# Patient Record
Sex: FEMALE | Race: WHITE | Employment: STUDENT | URBAN - METROPOLITAN AREA
[De-identification: names, ages, dates, MRNs, and addresses within clinical notes are randomized per-mention and may not be internally consistent; named-entity substitution may affect disease eponyms.]

---
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
## 2024-01-02 LAB — RPR, EXTERNAL RESULT: NORMAL

## 2024-01-29 ENCOUNTER — HOSPITAL ENCOUNTER (OUTPATIENT)
Age: 19
Discharge: HOME | End: 2024-01-29
Attending: OBSTETRICS & GYNECOLOGY | Admitting: OBSTETRICS & GYNECOLOGY
Payer: COMMERCIAL

## 2024-01-29 DIAGNOSIS — O23.43 URINARY TRACT INFECTION IN MOTHER DURING THIRD TRIMESTER OF PREGNANCY: Primary | ICD-10-CM

## 2024-01-29 LAB
AMPHETAMINES: NEGATIVE
BACTERIA: NEGATIVE /HPF
BARBITURATE SCREEN URINE: NEGATIVE
BENZODIAZEPINE SCREEN, URINE: NEGATIVE
BILIRUBIN URINE: NEGATIVE MG/DL
BLOOD, URINE: ABNORMAL
CANNABINOID SCREEN URINE: NEGATIVE
CLARITY: ABNORMAL
COCAINE METABOLITE: NEGATIVE
COLOR: YELLOW
FENTANYL URINE: NEGATIVE
GLUCOSE, URINE: NEGATIVE MG/DL
KETONES, URINE: NEGATIVE MG/DL
LEUKOCYTE ESTERASE, URINE: ABNORMAL
MUCUS: ABNORMAL HPF
NITRITE URINE, QUANTITATIVE: NEGATIVE
OPIATES, URINE: NEGATIVE
OXYCODONE: NEGATIVE
PH, URINE: 6 (ref 5–8)
PROTEIN UA: NEGATIVE MG/DL
RBC URINE: 29 /HPF (ref 0–6)
RENAL EPITHELIAL, UA: 1 /HPF
SPECIFIC GRAVITY UA: 1.02 (ref 1–1.03)
SQUAMOUS EPITHELIAL: 16 /HPF
TRICHOMONAS: ABNORMAL /HPF
UROBILINOGEN, URINE: 0.2 MG/DL (ref 0.2–1)
WBC UA: 91 /HPF (ref 0–5)

## 2024-01-29 PROCEDURE — 80307 DRUG TEST PRSMV CHEM ANLYZR: CPT

## 2024-01-29 PROCEDURE — 81001 URINALYSIS AUTO W/SCOPE: CPT

## 2024-01-29 PROCEDURE — 99213 OFFICE O/P EST LOW 20 MIN: CPT

## 2024-01-29 RX ORDER — NITROFURANTOIN 25; 75 MG/1; MG/1
100 CAPSULE ORAL 2 TIMES DAILY
Qty: 14 CAPSULE | Refills: 0 | Status: SHIPPED | OUTPATIENT
Start: 2024-01-29 | End: 2024-02-05

## 2024-01-30 VITALS
BODY MASS INDEX: 32.39 KG/M2 | HEART RATE: 91 BPM | TEMPERATURE: 98.2 F | RESPIRATION RATE: 18 BRPM | SYSTOLIC BLOOD PRESSURE: 133 MMHG | DIASTOLIC BLOOD PRESSURE: 92 MMHG | OXYGEN SATURATION: 100 % | WEIGHT: 176 LBS | HEIGHT: 62 IN

## 2024-01-30 NOTE — FLOWSHEET NOTE
Dr. Sy @ nurses' station.  Reported to doc patient's complaints, GA, assessment findings, and FMS reviewed.  Orders received.

## 2024-01-30 NOTE — FLOWSHEET NOTE
Assessment complete. Abdomen soft and non tender.  Positive fetal movement.  Patient denies any vaginal bleeding or leaking of fluid.  Patient states her worsening cramping pain started approximately 8 am this morning.  Patient reports burning while voiding as well as urgency.  Patient denies ahaving any intercourse in the last 24 hours. Instructed patient on tentative POC.  Patient voiced understanding.

## 2024-01-30 NOTE — DISCHARGE INSTRUCTIONS
LABOR    Call your doctor if you have these signs:     Regular tightening of the uterus coming as often as once every 15 minutes     Menstrual-like cramps, they may be regular, or may be continuous and move to   your back.     A low, dull backache different from what you normally experience. It may come and go.    Vaginal leaking of fluid.     Any bleeding from your vagina.    Pain, burning or bleeding when you urinate.      LABOR    Call your doctor, or come to the hospital, if you have:    Contractions coming about every 3-5 minutes apart, for about one hour. Labor contractions are usually strong enough that you can not walk or talk while having the contractions. ( Contractions can feel like menstrual cramps, tightening in your abdomen, rectal pressure or a backache. This feeling comes and goes.)    Vaginal leaking of fluid.    Heavy, bright red bleeding, like the days of your period. ( A little bloody show or spotting is normal in labor.)    ALWAYS CALL YOUR DOCTOR IF YOU:    Notice decreased movement of your baby, different from what you are used to.    Have heavy, bright red bleeding, like the heavy days of your periods.    Have vaginal leaking of fluid.    Keep your next appointment as scheduled. Get prescription filled and take as ordered.    Activity : Regular    Diet : Regular

## 2024-02-19 LAB — GBS, EXTERNAL RESULT: NEGATIVE

## 2024-02-27 ENCOUNTER — HOSPITAL ENCOUNTER (OUTPATIENT)
Age: 19
Discharge: HOME | End: 2024-02-27
Attending: STUDENT IN AN ORGANIZED HEALTH CARE EDUCATION/TRAINING PROGRAM | Admitting: STUDENT IN AN ORGANIZED HEALTH CARE EDUCATION/TRAINING PROGRAM
Payer: COMMERCIAL

## 2024-02-27 VITALS
DIASTOLIC BLOOD PRESSURE: 64 MMHG | WEIGHT: 181 LBS | HEART RATE: 117 BPM | OXYGEN SATURATION: 98 % | HEIGHT: 62 IN | SYSTOLIC BLOOD PRESSURE: 121 MMHG | BODY MASS INDEX: 33.31 KG/M2 | RESPIRATION RATE: 18 BRPM | TEMPERATURE: 99 F

## 2024-02-27 LAB
BACTERIA: ABNORMAL /HPF
BILIRUBIN URINE: NEGATIVE MG/DL
BLOOD, URINE: ABNORMAL
CLARITY: CLEAR
COLOR: YELLOW
GLUCOSE, URINE: NEGATIVE MG/DL
KETONES, URINE: 15 MG/DL
LEUKOCYTE ESTERASE, URINE: ABNORMAL
NITRITE URINE, QUANTITATIVE: NEGATIVE
PH, URINE: 6.5 (ref 5–8)
PROTEIN UA: NEGATIVE MG/DL
RBC URINE: 0 /HPF (ref 0–6)
SPECIFIC GRAVITY UA: 1.01 (ref 1–1.03)
SQUAMOUS EPITHELIAL: 3 /HPF
TRICHOMONAS: ABNORMAL /HPF
UROBILINOGEN, URINE: 0.2 MG/DL (ref 0.2–1)
WBC CLUMP: ABNORMAL /HPF
WBC UA: 247 /HPF (ref 0–5)
YEAST: ABNORMAL /HPF

## 2024-02-27 PROCEDURE — 96361 HYDRATE IV INFUSION ADD-ON: CPT

## 2024-02-27 PROCEDURE — 2580000003 HC RX 258: Performed by: STUDENT IN AN ORGANIZED HEALTH CARE EDUCATION/TRAINING PROGRAM

## 2024-02-27 PROCEDURE — 81001 URINALYSIS AUTO W/SCOPE: CPT

## 2024-02-27 PROCEDURE — 96360 HYDRATION IV INFUSION INIT: CPT

## 2024-02-27 PROCEDURE — 99214 OFFICE O/P EST MOD 30 MIN: CPT

## 2024-02-27 RX ORDER — SODIUM CHLORIDE, SODIUM LACTATE, POTASSIUM CHLORIDE, AND CALCIUM CHLORIDE .6; .31; .03; .02 G/100ML; G/100ML; G/100ML; G/100ML
2000 INJECTION, SOLUTION INTRAVENOUS ONCE
Status: COMPLETED | OUTPATIENT
Start: 2024-02-27 | End: 2024-02-27

## 2024-02-27 RX ORDER — 0.9 % SODIUM CHLORIDE 0.9 %
1000 INTRAVENOUS SOLUTION INTRAVENOUS ONCE
Status: DISCONTINUED | OUTPATIENT
Start: 2024-02-27 | End: 2024-02-27 | Stop reason: HOSPADM

## 2024-02-27 RX ORDER — CEPHALEXIN 500 MG/1
500 CAPSULE ORAL 4 TIMES DAILY
Qty: 28 CAPSULE | Refills: 0 | Status: SHIPPED | OUTPATIENT
Start: 2024-02-27 | End: 2024-03-05

## 2024-02-27 RX ADMIN — SODIUM CHLORIDE, POTASSIUM CHLORIDE, SODIUM LACTATE AND CALCIUM CHLORIDE 1000 ML: 600; 310; 30; 20 INJECTION, SOLUTION INTRAVENOUS at 01:12

## 2024-02-27 ASSESSMENT — PAIN DESCRIPTION - DESCRIPTORS: DESCRIPTORS: PRESSURE

## 2024-02-27 NOTE — FLOWSHEET NOTE
TR to Dr. Harrell regarding patient refusing IV fluids, refusing antibiotic, and requesting to leave.  Patient tells nurse she is tired and just wants to go home.  Informed doc nurse has educated patient that her lab work shows signs of a UTI that needs treated, and her baby's FHT pattern is tachycardic and may benefit from more fluid.  RN explained to patient and patient's mother the doctor advises patient to stay and be treated and the FMS is concerning.  RN asks doc if she would like to come out to triage room and attempt to discuss the risks of leaving before getting treatment completely or if she would like the patient to sign an AMA paper.  Dr. Harrell instructs nurse to have patient sign the AMA paper, and she will call an antibiotic in to the patient's pharmacy.  RN voiced understanding.

## 2024-02-27 NOTE — FLOWSHEET NOTE
Discharge instructions reviewed with patient.  Patient voiced understanding.  Monitors removed by patient @ [id number].  Instructed to change out of gown.

## 2024-02-27 NOTE — FLOWSHEET NOTE
Patient presents to BC with c/o pressure.  Oriented to LT-04 and call light system.  Instructed to change into gown and in need of CCUA.  Patient voiced understanding.

## 2024-02-27 NOTE — FLOWSHEET NOTE
Assessment complete. Abdomen soft and non tender.  Positive fetal movement.  Patient denies any vaginal bleeding or leaking of fluid.  Patient states her worsening vaginal pressure pain started approximately 1 hour ago.  Patient states she had just finished a prescription for a yeast infection 2 days ago.  Patient states she had seen her regular OB yesterday.   Instructed patient on tentative POC.  Patient voiced understanding.

## 2024-02-27 NOTE — DISCHARGE INSTRUCTIONS
LABOR    Call your doctor if you have these signs:     Regular tightening of the uterus coming as often as once every 15 minutes     Menstrual-like cramps, they may be regular, or may be continuous and move to   your back.     A low, dull backache different from what you normally experience. It may come and go.    Vaginal leaking of fluid.     Any bleeding from your vagina.    Pain, burning or bleeding when you urinate.      LABOR    Call your doctor, or come to the hospital, if you have:    Contractions coming about every 3-5 minutes apart, for about one hour. Labor contractions are usually strong enough that you can not walk or talk while having the contractions. ( Contractions can feel like menstrual cramps, tightening in your abdomen, rectal pressure or a backache. This feeling comes and goes.)    Vaginal leaking of fluid.    Heavy, bright red bleeding, like the days of your period. ( A little bloody show or spotting is normal in labor.)    ALWAYS CALL YOUR DOCTOR IF YOU:    Notice decreased movement of your baby, different from what you are used to.    Have heavy, bright red bleeding, like the heavy days of your periods.    Have vaginal leaking of fluid.    Keep your next appointment     Activity: As Tolerated    Diet: As Tolerated

## 2024-02-27 NOTE — FLOWSHEET NOTE
TR to Dr. Harrell regarding patient's complaints, GA, OB history, patient's VS, FMS, SVE results, and patient telling nurse she has not had much fluids throughout the day.  New orders received.  RN voiced understanding.

## 2024-02-28 ENCOUNTER — HOSPITAL ENCOUNTER (INPATIENT)
Age: 19
LOS: 3 days | Discharge: HOME | End: 2024-03-02
Attending: STUDENT IN AN ORGANIZED HEALTH CARE EDUCATION/TRAINING PROGRAM | Admitting: OBSTETRICS & GYNECOLOGY
Payer: COMMERCIAL

## 2024-02-28 LAB
ABO/RH: NORMAL
AMPHETAMINES: NEGATIVE
ANTIBODY SCREEN: NEGATIVE
BARBITURATE SCREEN URINE: NEGATIVE
BENZODIAZEPINE SCREEN, URINE: NEGATIVE
BILIRUBIN URINE: NEGATIVE MG/DL
BLOOD, URINE: ABNORMAL
CANNABINOID SCREEN URINE: NEGATIVE
CLARITY: ABNORMAL
COCAINE METABOLITE: NEGATIVE
COLOR: YELLOW
FENTANYL URINE: NEGATIVE
GLUCOSE, URINE: NEGATIVE MG/DL
HCT VFR BLD CALC: 33.9 % (ref 37–47)
HEMOGLOBIN: 10.3 GM/DL (ref 12.5–16)
KETONES, URINE: 40 MG/DL
LEUKOCYTE ESTERASE, URINE: ABNORMAL
MCH RBC QN AUTO: 24.8 PG (ref 27–31)
MCHC RBC AUTO-ENTMCNC: 30.4 % (ref 32–36)
MCV RBC AUTO: 81.5 FL (ref 78–100)
NITRITE URINE, QUANTITATIVE: NEGATIVE
OPIATES, URINE: NEGATIVE
OXYCODONE: NEGATIVE
PDW BLD-RTO: 15 % (ref 11.7–14.9)
PH, URINE: 6 (ref 5–8)
PLATELET # BLD: 272 K/CU MM (ref 140–440)
PMV BLD AUTO: 10.3 FL (ref 7.5–11.1)
PROTEIN UA: 30 MG/DL
RBC # BLD: 4.16 M/CU MM (ref 4.2–5.4)
SPECIFIC GRAVITY UA: 1.01 (ref 1–1.03)
UROBILINOGEN, URINE: 0.2 MG/DL (ref 0.2–1)
WBC # BLD: 20.6 K/CU MM (ref 4–10.5)

## 2024-02-28 PROCEDURE — 6370000000 HC RX 637 (ALT 250 FOR IP): Performed by: OBSTETRICS & GYNECOLOGY

## 2024-02-28 PROCEDURE — 87186 SC STD MICRODIL/AGAR DIL: CPT

## 2024-02-28 PROCEDURE — 6360000002 HC RX W HCPCS: Performed by: OBSTETRICS & GYNECOLOGY

## 2024-02-28 PROCEDURE — 1220000000 HC SEMI PRIVATE OB R&B

## 2024-02-28 PROCEDURE — 86901 BLOOD TYPING SEROLOGIC RH(D): CPT

## 2024-02-28 PROCEDURE — 85027 COMPLETE CBC AUTOMATED: CPT

## 2024-02-28 PROCEDURE — 86850 RBC ANTIBODY SCREEN: CPT

## 2024-02-28 PROCEDURE — 80307 DRUG TEST PRSMV CHEM ANLYZR: CPT

## 2024-02-28 PROCEDURE — 81001 URINALYSIS AUTO W/SCOPE: CPT

## 2024-02-28 PROCEDURE — 87086 URINE CULTURE/COLONY COUNT: CPT

## 2024-02-28 PROCEDURE — 2580000003 HC RX 258: Performed by: OBSTETRICS & GYNECOLOGY

## 2024-02-28 PROCEDURE — 87077 CULTURE AEROBIC IDENTIFY: CPT

## 2024-02-28 PROCEDURE — 86780 TREPONEMA PALLIDUM: CPT

## 2024-02-28 PROCEDURE — 86900 BLOOD TYPING SEROLOGIC ABO: CPT

## 2024-02-28 RX ORDER — CARBOPROST TROMETHAMINE 250 UG/ML
250 INJECTION, SOLUTION INTRAMUSCULAR PRN
Status: DISCONTINUED | OUTPATIENT
Start: 2024-02-28 | End: 2024-03-02 | Stop reason: HOSPADM

## 2024-02-28 RX ORDER — ONDANSETRON 2 MG/ML
4 INJECTION INTRAMUSCULAR; INTRAVENOUS EVERY 6 HOURS PRN
Status: DISCONTINUED | OUTPATIENT
Start: 2024-02-28 | End: 2024-02-28 | Stop reason: HOSPADM

## 2024-02-28 RX ORDER — FAMOTIDINE 10 MG/ML
20 INJECTION, SOLUTION INTRAVENOUS 2 TIMES DAILY PRN
Status: DISCONTINUED | OUTPATIENT
Start: 2024-02-28 | End: 2024-02-28 | Stop reason: HOSPADM

## 2024-02-28 RX ORDER — ACETAMINOPHEN 500 MG
1000 TABLET ORAL EVERY 8 HOURS PRN
Status: DISCONTINUED | OUTPATIENT
Start: 2024-02-28 | End: 2024-03-02 | Stop reason: HOSPADM

## 2024-02-28 RX ORDER — SODIUM CHLORIDE 9 MG/ML
25 INJECTION, SOLUTION INTRAVENOUS PRN
Status: DISCONTINUED | OUTPATIENT
Start: 2024-02-28 | End: 2024-03-02 | Stop reason: HOSPADM

## 2024-02-28 RX ORDER — SODIUM CHLORIDE 9 MG/ML
INJECTION, SOLUTION INTRAVENOUS CONTINUOUS
Status: DISCONTINUED | OUTPATIENT
Start: 2024-02-28 | End: 2024-03-02 | Stop reason: HOSPADM

## 2024-02-28 RX ORDER — METHYLERGONOVINE MALEATE 0.2 MG/ML
200 INJECTION INTRAVENOUS PRN
Status: DISCONTINUED | OUTPATIENT
Start: 2024-02-28 | End: 2024-02-28

## 2024-02-28 RX ORDER — FENTANYL CITRATE 50 UG/ML
100 INJECTION, SOLUTION INTRAMUSCULAR; INTRAVENOUS
Status: DISCONTINUED | OUTPATIENT
Start: 2024-02-28 | End: 2024-02-28 | Stop reason: HOSPADM

## 2024-02-28 RX ORDER — SODIUM CHLORIDE 0.9 % (FLUSH) 0.9 %
5-40 SYRINGE (ML) INJECTION EVERY 12 HOURS SCHEDULED
Status: DISCONTINUED | OUTPATIENT
Start: 2024-02-28 | End: 2024-03-02 | Stop reason: HOSPADM

## 2024-02-28 RX ORDER — MISOPROSTOL 200 UG/1
800 TABLET ORAL PRN
Status: DISCONTINUED | OUTPATIENT
Start: 2024-02-28 | End: 2024-02-28

## 2024-02-28 RX ORDER — DIPHENHYDRAMINE HCL 25 MG
25 TABLET ORAL NIGHTLY PRN
Status: DISCONTINUED | OUTPATIENT
Start: 2024-02-28 | End: 2024-03-02 | Stop reason: HOSPADM

## 2024-02-28 RX ORDER — 0.9 % SODIUM CHLORIDE 0.9 %
1000 INTRAVENOUS SOLUTION INTRAVENOUS ONCE
Status: COMPLETED | OUTPATIENT
Start: 2024-02-28 | End: 2024-02-28

## 2024-02-28 RX ORDER — TRANEXAMIC ACID 10 MG/ML
1000 INJECTION, SOLUTION INTRAVENOUS
Status: DISCONTINUED | OUTPATIENT
Start: 2024-02-28 | End: 2024-02-28

## 2024-02-28 RX ORDER — SODIUM CHLORIDE 0.9 % (FLUSH) 0.9 %
5-40 SYRINGE (ML) INJECTION PRN
Status: DISCONTINUED | OUTPATIENT
Start: 2024-02-28 | End: 2024-03-02 | Stop reason: HOSPADM

## 2024-02-28 RX ORDER — LIDOCAINE HYDROCHLORIDE 10 MG/ML
30 INJECTION, SOLUTION EPIDURAL; INFILTRATION; INTRACAUDAL; PERINEURAL PRN
Status: DISCONTINUED | OUTPATIENT
Start: 2024-02-28 | End: 2024-03-02 | Stop reason: HOSPADM

## 2024-02-28 RX ADMIN — SODIUM CHLORIDE: 9 INJECTION, SOLUTION INTRAVENOUS at 20:40

## 2024-02-28 RX ADMIN — ACETAMINOPHEN 1000 MG: 500 TABLET ORAL at 21:34

## 2024-02-28 RX ADMIN — CEFTRIAXONE 1000 MG: 1 INJECTION, POWDER, FOR SOLUTION INTRAMUSCULAR; INTRAVENOUS at 20:57

## 2024-02-28 RX ADMIN — SODIUM CHLORIDE: 9 INJECTION, SOLUTION INTRAVENOUS at 22:09

## 2024-02-28 RX ADMIN — SODIUM CHLORIDE 1000 ML: 9 INJECTION, SOLUTION INTRAVENOUS at 20:53

## 2024-02-29 LAB
BACTERIA: ABNORMAL /HPF
BASOPHILS ABSOLUTE: 0 K/CU MM
BASOPHILS ABSOLUTE: 0 K/CU MM
BASOPHILS RELATIVE PERCENT: 0.1 % (ref 0–1)
BASOPHILS RELATIVE PERCENT: 0.2 % (ref 0–1)
DIFFERENTIAL TYPE: ABNORMAL
DIFFERENTIAL TYPE: ABNORMAL
EOSINOPHILS ABSOLUTE: 0 K/CU MM
EOSINOPHILS ABSOLUTE: 0 K/CU MM
EOSINOPHILS RELATIVE PERCENT: 0 % (ref 0–3)
EOSINOPHILS RELATIVE PERCENT: 0 % (ref 0–3)
HCT VFR BLD CALC: 28.2 % (ref 37–47)
HEMOGLOBIN: 8.4 GM/DL (ref 12.5–16)
IMMATURE NEUTROPHIL %: 1.1 % (ref 0–0.43)
IMMATURE NEUTROPHIL %: 2.1 % (ref 0–0.43)
LACTIC ACID, SEPSIS: 1 MMOL/L (ref 0.4–2)
LYMPHOCYTES ABSOLUTE: 1.3 K/CU MM
LYMPHOCYTES ABSOLUTE: 1.4 K/CU MM
LYMPHOCYTES RELATIVE PERCENT: 6.2 % (ref 25–45)
LYMPHOCYTES RELATIVE PERCENT: 6.9 % (ref 25–45)
MCH RBC QN AUTO: 24.6 PG (ref 27–31)
MCHC RBC AUTO-ENTMCNC: 29.8 % (ref 32–36)
MCV RBC AUTO: 82.7 FL (ref 78–100)
MONOCYTES ABSOLUTE: 2.1 K/CU MM
MONOCYTES ABSOLUTE: 2.5 K/CU MM
MONOCYTES RELATIVE PERCENT: 10.2 % (ref 0–4)
MONOCYTES RELATIVE PERCENT: 12.1 % (ref 0–4)
NUCLEATED RBC %: 0 %
NUCLEATED RBC %: 0 %
PDW BLD-RTO: 15.1 % (ref 11.7–14.9)
PLATELET # BLD: 227 K/CU MM (ref 140–440)
PMV BLD AUTO: 10.4 FL (ref 7.5–11.1)
RBC # BLD: 3.41 M/CU MM (ref 4.2–5.4)
RBC URINE: 6 /HPF (ref 0–6)
SEGMENTED NEUTROPHILS ABSOLUTE COUNT: 16.3 K/CU MM
SEGMENTED NEUTROPHILS ABSOLUTE COUNT: 16.7 K/CU MM
SEGMENTED NEUTROPHILS RELATIVE PERCENT: 79.4 % (ref 34–64)
SEGMENTED NEUTROPHILS RELATIVE PERCENT: 81.7 % (ref 34–64)
SQUAMOUS EPITHELIAL: 18 /HPF
T. PALLIDUM SCREEN: NEGATIVE
TOTAL IMMATURE NEUTOROPHIL: 0.23 K/CU MM
TOTAL IMMATURE NEUTOROPHIL: 0.43 K/CU MM
TOTAL NUCLEATED RBC: 0 K/CU MM
TOTAL NUCLEATED RBC: 0 K/CU MM
TRICHOMONAS: ABNORMAL /HPF
WBC # BLD: 20.5 K/CU MM (ref 4–10.5)
WBC CLUMP: ABNORMAL /HPF
WBC UA: 889 /HPF (ref 0–5)

## 2024-02-29 PROCEDURE — 2580000003 HC RX 258: Performed by: OBSTETRICS & GYNECOLOGY

## 2024-02-29 PROCEDURE — 85025 COMPLETE CBC W/AUTO DIFF WBC: CPT

## 2024-02-29 PROCEDURE — 6370000000 HC RX 637 (ALT 250 FOR IP): Performed by: OBSTETRICS & GYNECOLOGY

## 2024-02-29 PROCEDURE — 87040 BLOOD CULTURE FOR BACTERIA: CPT

## 2024-02-29 PROCEDURE — 1220000000 HC SEMI PRIVATE OB R&B

## 2024-02-29 PROCEDURE — 59025 FETAL NON-STRESS TEST: CPT

## 2024-02-29 PROCEDURE — 36415 COLL VENOUS BLD VENIPUNCTURE: CPT

## 2024-02-29 PROCEDURE — 83605 ASSAY OF LACTIC ACID: CPT

## 2024-02-29 PROCEDURE — 6370000000 HC RX 637 (ALT 250 FOR IP)

## 2024-02-29 PROCEDURE — 6360000002 HC RX W HCPCS

## 2024-02-29 PROCEDURE — 6360000002 HC RX W HCPCS: Performed by: OBSTETRICS & GYNECOLOGY

## 2024-02-29 RX ORDER — DOCUSATE SODIUM 100 MG/1
100 CAPSULE, LIQUID FILLED ORAL DAILY
Status: DISCONTINUED | OUTPATIENT
Start: 2024-02-29 | End: 2024-03-02 | Stop reason: HOSPADM

## 2024-02-29 RX ORDER — ONDANSETRON 2 MG/ML
4 INJECTION INTRAMUSCULAR; INTRAVENOUS EVERY 6 HOURS PRN
Status: DISCONTINUED | OUTPATIENT
Start: 2024-02-29 | End: 2024-03-02 | Stop reason: HOSPADM

## 2024-02-29 RX ORDER — HYDROXYZINE PAMOATE 25 MG/1
50 CAPSULE ORAL NIGHTLY PRN
Status: DISCONTINUED | OUTPATIENT
Start: 2024-02-29 | End: 2024-03-02 | Stop reason: HOSPADM

## 2024-02-29 RX ADMIN — ACETAMINOPHEN 1000 MG: 500 TABLET ORAL at 04:45

## 2024-02-29 RX ADMIN — ONDANSETRON 4 MG: 2 INJECTION INTRAMUSCULAR; INTRAVENOUS at 15:55

## 2024-02-29 RX ADMIN — ACETAMINOPHEN 1000 MG: 500 TABLET ORAL at 14:14

## 2024-02-29 RX ADMIN — ACETAMINOPHEN 1000 MG: 500 TABLET ORAL at 22:03

## 2024-02-29 RX ADMIN — SODIUM CHLORIDE: 9 INJECTION, SOLUTION INTRAVENOUS at 06:31

## 2024-02-29 RX ADMIN — DOCUSATE SODIUM 100 MG: 100 CAPSULE, LIQUID FILLED ORAL at 14:14

## 2024-02-29 RX ADMIN — CEFTRIAXONE 1000 MG: 1 INJECTION, POWDER, FOR SOLUTION INTRAMUSCULAR; INTRAVENOUS at 21:11

## 2024-02-29 ASSESSMENT — PAIN DESCRIPTION - DESCRIPTORS
DESCRIPTORS: ACHING;DULL
DESCRIPTORS: ACHING;DULL

## 2024-02-29 ASSESSMENT — PAIN DESCRIPTION - ORIENTATION: ORIENTATION: RIGHT

## 2024-02-29 ASSESSMENT — PAIN DESCRIPTION - LOCATION
LOCATION: BACK
LOCATION: BACK

## 2024-02-29 ASSESSMENT — PAIN SCALES - GENERAL
PAINLEVEL_OUTOF10: 8
PAINLEVEL_OUTOF10: 8
PAINLEVEL_OUTOF10: 4
PAINLEVEL_OUTOF10: 8

## 2024-02-29 ASSESSMENT — PAIN - FUNCTIONAL ASSESSMENT: PAIN_FUNCTIONAL_ASSESSMENT: ACTIVITIES ARE NOT PREVENTED

## 2024-02-29 ASSESSMENT — PAIN DESCRIPTION - PAIN TYPE: TYPE: ACUTE PAIN

## 2024-02-29 NOTE — FLOWSHEET NOTE
Dr. Mak @ nurses' station and reviewed FMS.  Orders to remove continuous fetal monitoring and perform NSTs twice daily starting tomorrow.  RN voiced understanding.

## 2024-02-29 NOTE — FLOWSHEET NOTE
Dr. Mak present when patient arrives at desk.  Dr Mak in to see patient and assess patient.  Informed doc patient was just @ BC and signed-out AMA.  Informed doc of previous FMS and nursing interventions.  Informed doc patient just filled prescription this morning and has not started med yet.  Plan of care reviewed with patient and mother.  All questions answered.

## 2024-02-29 NOTE — H&P
on file   Transportation Needs: Not on file   Physical Activity: Not on file   Stress: Not on file   Social Connections: Not on file   Intimate Partner Violence: Not on file   Housing Stability: Not on file     Family History:   No family history on file.  Medications Prior to Admission:  Medications Prior to Admission: cephALEXin (KEFLEX) 500 MG capsule, Take 1 capsule by mouth 4 times daily for 7 days  Doxylamine Succinate, Sleep, (UNISOM PO), Take by mouth  vitamin B-6 (PYRIDOXINE) 50 MG tablet, Take 1 tablet by mouth daily (Patient not taking: Reported on 2/27/2024)  Prenatal MV-Min-Fe Fum-FA-DHA (PRENATAL 1 PO), Take by mouth  pantoprazole (PROTONIX) 20 MG tablet, Take 1 tablet by mouth daily (Patient not taking: Reported on 2/27/2024)  ondansetron (ZOFRAN) 4 MG tablet, Take 1 tablet by mouth every 8 hours as needed for Nausea or Vomiting (Patient not taking: Reported on 2/27/2024)  cetirizine (ZYRTEC) 10 MG tablet, Take 1 tablet by mouth daily (Patient not taking: Reported on 2/27/2024)    REVIEW OF SYSTEMS:    CONSTITUTIONAL:  negative  RESPIRATORY:  negative  CARDIOVASCULAR:  negative  GASTROINTESTINAL:  negative  ALLERGIC/IMMUNOLOGIC:  negative  NEUROLOGICAL:  negative  BEHAVIOR/PSYCH:  negative    PHYSICAL EXAM:  Blood pressure 120/66, pulse (!) 126, last menstrual period 06/10/2023, SpO2 100 %, unknown if currently breastfeeding.  General appearance:  awake, alert, cooperative, no apparent distress, and appears stated age  Neurologic:  Awake, alert, oriented to name, place and time.    Lungs:  No increased work of breathing, good air exchange  Abdomen:  Soft, non tender, gravid, consistent with her gestational age  Fetal heart rate:    Baseline Heart Rate:   Category 1 tracing     Pelvis:  Adequate pelvis  Cervix: ftp/Closed Long firm -3    Contraction frequency:  2 minutes    Membranes:  Intact    Labs:  CBC: admission labs pending  Lab Results   Component Value Date/Time    WBC 8.3 10/18/2023 01:11 AM     RBC 4.07 10/18/2023 01:11 AM    HGB 11.6 10/18/2023 01:11 AM    HCT 34.7 10/18/2023 01:11 AM    MCV 85.3 10/18/2023 01:11 AM    RDW 12.6 10/18/2023 01:11 AM     10/18/2023 01:11 AM         ASSESSMENT AND PLAN:  IUP at 37 weeks with pyelonephritis. Admit, IVF, IV antibiotics, intermittent monitoring once we have a reactive cat 1 strip. Rocephin 1g Q24h.

## 2024-02-29 NOTE — FLOWSHEET NOTE
Patient presents to BC with c/o not feeling the baby move since yesterday.  Oriented to LT-06 and call light system.  Instructed to change into gown and in need of CCUA.  Patient voiced understanding.

## 2024-02-29 NOTE — PROGRESS NOTES
Notifed of temp >102  Ordered labs for sepsis protocol including BC and lactic acid.  Cat 1 tracing

## 2024-02-29 NOTE — PROGRESS NOTES
SUBJECTIVE:  No chief complaint on file.    Pt feeling better already s/p 1 dose of rocephin.  Temp resolved.  Katherine diet. No n/v     OBJECTIVE    Vitals:  /68   Pulse 89   Temp 97.7 °F (36.5 °C) (Oral)   Resp 18   LMP 06/10/2023   SpO2 99%     CONSTITUTIONAL:  awake, alert, cooperative, no apparent distress, and appears stated age  ABDOMEN:  No scars, normal bowel sounds, soft, non-distended, non-tender, no masses palpated, no hepatosplenomegally    Membranes:    Intact    Fetal heart rate:        Baseline FHR :    130 bpm      cat 1 tracing - reviewed jayme HUERTA          Contraction frequency:  rare minutes    DATA:  CBC:   Lab Results   Component Value Date/Time    WBC 20.5 2024 05:30 AM    RBC 3.41 2024 05:30 AM    HGB 8.4 2024 05:30 AM    HCT 28.2 2024 05:30 AM    MCV 82.7 2024 05:30 AM    RDW 15.1 2024 05:30 AM     2024 05:30 AM       ASSESSMENT:    18 y.o.  year old  at 37w5d  with 3/16/2024, with pyelonephritis        PLAN: continue IV abx, IVF. Await culture results.

## 2024-02-29 NOTE — FLOWSHEET NOTE
0710- Pt asleep in bed IV infusing w/o difficulty. Family at bedside sleeping as well.     1010- monitors placed for NST after pt completed breakfast.     1050-  Dr Mak in to see pt.  Reviewed strip for NST. NST reactive and my remove monitors.     1600- pt still c/o nausea, agrees to try Zofran to feel better. Family at bedside. NAD noted.

## 2024-03-01 ENCOUNTER — APPOINTMENT (OUTPATIENT)
Dept: ULTRASOUND IMAGING | Age: 19
End: 2024-03-01
Payer: COMMERCIAL

## 2024-03-01 LAB
BASOPHILS ABSOLUTE: 0.1 K/CU MM
BASOPHILS RELATIVE PERCENT: 0.3 % (ref 0–1)
CULTURE: ABNORMAL
CULTURE: ABNORMAL
DIFFERENTIAL TYPE: ABNORMAL
EOSINOPHILS ABSOLUTE: 0 K/CU MM
EOSINOPHILS RELATIVE PERCENT: 0.1 % (ref 0–3)
HCT VFR BLD CALC: 29.7 % (ref 37–47)
HEMOGLOBIN: 8.6 GM/DL (ref 12.5–16)
IMMATURE NEUTROPHIL %: 2.6 % (ref 0–0.43)
LYMPHOCYTES ABSOLUTE: 1.7 K/CU MM
LYMPHOCYTES RELATIVE PERCENT: 9.3 % (ref 25–45)
Lab: ABNORMAL
MCH RBC QN AUTO: 24.2 PG (ref 27–31)
MCHC RBC AUTO-ENTMCNC: 29 % (ref 32–36)
MCV RBC AUTO: 83.4 FL (ref 78–100)
MONOCYTES ABSOLUTE: 1.8 K/CU MM
MONOCYTES RELATIVE PERCENT: 10 % (ref 0–4)
NUCLEATED RBC %: 0 %
PDW BLD-RTO: 15.1 % (ref 11.7–14.9)
PLATELET # BLD: 241 K/CU MM (ref 140–440)
PMV BLD AUTO: 10.3 FL (ref 7.5–11.1)
RBC # BLD: 3.56 M/CU MM (ref 4.2–5.4)
SEGMENTED NEUTROPHILS ABSOLUTE COUNT: 13.8 K/CU MM
SEGMENTED NEUTROPHILS RELATIVE PERCENT: 77.7 % (ref 34–64)
SPECIMEN: ABNORMAL
TOTAL IMMATURE NEUTOROPHIL: 0.47 K/CU MM
TOTAL NUCLEATED RBC: 0 K/CU MM
WBC # BLD: 17.8 K/CU MM (ref 4–10.5)

## 2024-03-01 PROCEDURE — 2580000003 HC RX 258: Performed by: OBSTETRICS & GYNECOLOGY

## 2024-03-01 PROCEDURE — 6360000002 HC RX W HCPCS

## 2024-03-01 PROCEDURE — 85025 COMPLETE CBC W/AUTO DIFF WBC: CPT

## 2024-03-01 PROCEDURE — 59025 FETAL NON-STRESS TEST: CPT

## 2024-03-01 PROCEDURE — 6360000002 HC RX W HCPCS: Performed by: OBSTETRICS & GYNECOLOGY

## 2024-03-01 PROCEDURE — 94761 N-INVAS EAR/PLS OXIMETRY MLT: CPT

## 2024-03-01 PROCEDURE — 76775 US EXAM ABDO BACK WALL LIM: CPT

## 2024-03-01 PROCEDURE — 6370000000 HC RX 637 (ALT 250 FOR IP): Performed by: OBSTETRICS & GYNECOLOGY

## 2024-03-01 PROCEDURE — 1220000000 HC SEMI PRIVATE OB R&B

## 2024-03-01 RX ORDER — HYDROCODONE BITARTRATE AND ACETAMINOPHEN 5; 325 MG/1; MG/1
1 TABLET ORAL EVERY 4 HOURS PRN
Status: DISCONTINUED | OUTPATIENT
Start: 2024-03-01 | End: 2024-03-02 | Stop reason: HOSPADM

## 2024-03-01 RX ORDER — BISACODYL 10 MG
10 SUPPOSITORY, RECTAL RECTAL DAILY PRN
Status: DISCONTINUED | OUTPATIENT
Start: 2024-03-01 | End: 2024-03-02 | Stop reason: HOSPADM

## 2024-03-01 RX ORDER — OXYCODONE HYDROCHLORIDE 5 MG/1
5 TABLET ORAL EVERY 4 HOURS PRN
Status: DISCONTINUED | OUTPATIENT
Start: 2024-03-01 | End: 2024-03-02 | Stop reason: HOSPADM

## 2024-03-01 RX ORDER — FUROSEMIDE 20 MG/1
20 TABLET ORAL DAILY
Status: COMPLETED | OUTPATIENT
Start: 2024-03-01 | End: 2024-03-01

## 2024-03-01 RX ADMIN — ONDANSETRON 4 MG: 2 INJECTION INTRAMUSCULAR; INTRAVENOUS at 22:46

## 2024-03-01 RX ADMIN — OXYCODONE HYDROCHLORIDE 5 MG: 5 TABLET ORAL at 22:46

## 2024-03-01 RX ADMIN — HYDROCODONE BITARTRATE AND ACETAMINOPHEN 1 TABLET: 5; 325 TABLET ORAL at 20:07

## 2024-03-01 RX ADMIN — ACETAMINOPHEN 1000 MG: 500 TABLET ORAL at 09:07

## 2024-03-01 RX ADMIN — CEFTRIAXONE 1000 MG: 1 INJECTION, POWDER, FOR SOLUTION INTRAMUSCULAR; INTRAVENOUS at 22:24

## 2024-03-01 RX ADMIN — FUROSEMIDE 20 MG: 20 TABLET ORAL at 23:17

## 2024-03-01 RX ADMIN — ACETAMINOPHEN 1000 MG: 500 TABLET ORAL at 21:29

## 2024-03-01 ASSESSMENT — PAIN - FUNCTIONAL ASSESSMENT
PAIN_FUNCTIONAL_ASSESSMENT: ACTIVITIES ARE NOT PREVENTED

## 2024-03-01 ASSESSMENT — PAIN DESCRIPTION - ONSET
ONSET: AWAKENED FROM SLEEP
ONSET: ON-GOING

## 2024-03-01 ASSESSMENT — PAIN SCALES - GENERAL
PAINLEVEL_OUTOF10: 8
PAINLEVEL_OUTOF10: 0
PAINLEVEL_OUTOF10: 7
PAINLEVEL_OUTOF10: 0
PAINLEVEL_OUTOF10: 0
PAINLEVEL_OUTOF10: 7
PAINLEVEL_OUTOF10: 9
PAINLEVEL_OUTOF10: 0
PAINLEVEL_OUTOF10: 6
PAINLEVEL_OUTOF10: 0

## 2024-03-01 ASSESSMENT — PAIN DESCRIPTION - ORIENTATION
ORIENTATION: LOWER;MID

## 2024-03-01 ASSESSMENT — PAIN DESCRIPTION - FREQUENCY
FREQUENCY: INTERMITTENT
FREQUENCY: INTERMITTENT

## 2024-03-01 ASSESSMENT — PAIN DESCRIPTION - PAIN TYPE
TYPE: ACUTE PAIN
TYPE: ACUTE PAIN

## 2024-03-01 ASSESSMENT — PAIN DESCRIPTION - DESCRIPTORS
DESCRIPTORS: DISCOMFORT;DULL
DESCRIPTORS: DISCOMFORT;SORE
DESCRIPTORS: DISCOMFORT;SORE

## 2024-03-01 ASSESSMENT — PAIN DESCRIPTION - LOCATION
LOCATION: ABDOMEN;BACK

## 2024-03-01 NOTE — PLAN OF CARE
Problem: Pain  Goal: Verbalizes/displays adequate comfort level or baseline comfort level  3/1/2024 1006 by Audrey Larsen RN  Outcome: Progressing  3/1/2024 0016 by Agnieszka Rosas RN  Outcome: Progressing     Problem: Infection - Adult  Goal: Absence of infection at discharge  3/1/2024 0016 by Agnieszka Rosas RN  Outcome: Progressing  Flowsheets (Taken 2/29/2024 2111)  Absence of infection at discharge: Assess and monitor for signs and symptoms of infection  Goal: Absence of infection during hospitalization  3/1/2024 0016 by Agnieszka Rosas RN  Outcome: Progressing  Flowsheets (Taken 2/29/2024 2111)  Absence of infection during hospitalization: Assess and monitor for signs and symptoms of infection  Goal: Absence of fever/infection during anticipated neutropenic period  3/1/2024 0016 by Agnieszka Rosas RN  Outcome: Progressing  Flowsheets (Taken 2/29/2024 2111)  Absence of fever/infection during anticipated neutropenic period: Monitor white blood cell count     Problem: Safety - Adult  Goal: Free from fall injury  3/1/2024 1006 by Audrey Larsen RN  Outcome: Progressing  3/1/2024 0016 by Agnieszka Rosas RN  Outcome: Progressing     Problem: Discharge Planning  Goal: Discharge to home or other facility with appropriate resources  3/1/2024 0016 by Agnieszka Rosas RN  Outcome: Progressing  Flowsheets (Taken 2/29/2024 2111)  Discharge to home or other facility with appropriate resources: Identify discharge learning needs (meds, wound care, etc)     Problem: Chronic Conditions and Co-morbidities  Goal: Patient's chronic conditions and co-morbidity symptoms are monitored and maintained or improved  3/1/2024 0016 by Agnieszka Rosas, RN  Outcome: Progressing  Flowsheets (Taken 2/29/2024 2111)  Care Plan - Patient's Chronic Conditions and Co-Morbidity Symptoms are Monitored and Maintained or Improved:   Monitor and assess patient's chronic conditions  and comorbid symptoms for stability, deterioration, or improvement   Update acute care plan with appropriate goals if chronic or comorbid symptoms are exacerbated and prevent overall improvement and discharge

## 2024-03-01 NOTE — PLAN OF CARE
Problem: Pain  Goal: Verbalizes/displays adequate comfort level or baseline comfort level  Outcome: Progressing     Problem: Infection - Adult  Goal: Absence of infection at discharge  Outcome: Progressing  Flowsheets (Taken 2/29/2024 2111)  Absence of infection at discharge: Assess and monitor for signs and symptoms of infection  Goal: Absence of infection during hospitalization  Outcome: Progressing  Flowsheets (Taken 2/29/2024 2111)  Absence of infection during hospitalization: Assess and monitor for signs and symptoms of infection  Goal: Absence of fever/infection during anticipated neutropenic period  Outcome: Progressing  Flowsheets (Taken 2/29/2024 2111)  Absence of fever/infection during anticipated neutropenic period: Monitor white blood cell count     Problem: Safety - Adult  Goal: Free from fall injury  Outcome: Progressing     Problem: Discharge Planning  Goal: Discharge to home or other facility with appropriate resources  Outcome: Progressing  Flowsheets (Taken 2/29/2024 2111)  Discharge to home or other facility with appropriate resources: Identify discharge learning needs (meds, wound care, etc)     Problem: Chronic Conditions and Co-morbidities  Goal: Patient's chronic conditions and co-morbidity symptoms are monitored and maintained or improved  Outcome: Progressing  Flowsheets (Taken 2/29/2024 2111)  Care Plan - Patient's Chronic Conditions and Co-Morbidity Symptoms are Monitored and Maintained or Improved:   Monitor and assess patient's chronic conditions and comorbid symptoms for stability, deterioration, or improvement   Update acute care plan with appropriate goals if chronic or comorbid symptoms are exacerbated and prevent overall improvement and discharge

## 2024-03-01 NOTE — PROGRESS NOTES
SUBJECTIVE:  pt c/o pain - abdominal and low back.  C/o hands feeling \"puffy\",  feels pressure    OBJECTIVE    Vitals:  /83   Pulse 90   Temp 98.3 °F (36.8 °C) (Oral)   Resp 16   LMP 06/10/2023   SpO2 99%     CONSTITUTIONAL:  awake, alert, cooperative, no apparent distress, and appears stated age  ABDOMEN:  No scars, normal bowel sounds, soft, non-distended, non-tender, no masses palpated, no hepatosplenomegally  GENITAL/URINARY:  External Genitalia:      Cervix:  closed  per RN who checked her     Membranes:    Intact    Fetal heart rate:        Baseline FHR :    110 bpm              Fetal Accelerations:  present        Fetal Decelerations:  absent        Fetal Variability:   moderate    Contraction frequency:  0 minutes    DATA:   Component Value Units   Culture, Blood 1 [[id number]]    Collected: 02/29/24 0145    Updated: 03/01/24 1302    Specimen Type: Blood     Specimen BLOOD    Special Requests NONE    Culture NO AEROBIC GROWTH AT 24 HOURS   Narrative:     SETUP DATE/TIME:  02/29/2024 0731   Culture, Blood 2 [[id number]]    Collected: 02/29/24 0753    Updated: 03/01/24 1302    Specimen Source: Blood     Specimen BLOOD    Special Requests NONE    Culture NO AEROBIC GROWTH AT 24 HOURS   Narrative:     SETUP DATE/TIME:  02/29/2024 0927   Culture, Urine [[id number]] (Abnormal)     Collected: 02/28/24 2102    Updated: 03/01/24 0819    Specimen Source: Urine, clean catch     Specimen URINE CLEAN CATCH    Special Requests NONE    Culture Final Report     ESCHERICHIA COLI >100,000 CFU/ml Abnormal    Narrative:     SETUP DATE/TIME:  02/28/2024 2322   CBC with Auto Differential [[id number]] (Abnormal)    Collected: 03/01/24 0600    Updated: 03/01/24 0612    Specimen Type: Blood     WBC 17.8 High  K/CU MM    RBC 3.56 Low  M/CU MM    Hemoglobin 8.6 Low  GM/DL    Hematocrit 29.7 Low  %    MCV 83.4 FL    MCH 24.2 Low  PG    MCHC 29.0 Low  %    RDW 15.1 High  %    Platelets 241 K/CU MM    MPV 10.3 FL

## 2024-03-02 VITALS
SYSTOLIC BLOOD PRESSURE: 112 MMHG | TEMPERATURE: 98.3 F | RESPIRATION RATE: 18 BRPM | DIASTOLIC BLOOD PRESSURE: 71 MMHG | OXYGEN SATURATION: 98 % | HEART RATE: 76 BPM

## 2024-03-02 LAB
BASOPHILS ABSOLUTE: 0 K/CU MM
BASOPHILS RELATIVE PERCENT: 0.4 % (ref 0–1)
DIFFERENTIAL TYPE: ABNORMAL
EOSINOPHILS ABSOLUTE: 0.1 K/CU MM
EOSINOPHILS RELATIVE PERCENT: 1.4 % (ref 0–3)
HCT VFR BLD CALC: 30.6 % (ref 37–47)
HEMOGLOBIN: 9 GM/DL (ref 12.5–16)
IMMATURE NEUTROPHIL %: 2.2 % (ref 0–0.43)
LYMPHOCYTES ABSOLUTE: 1.8 K/CU MM
LYMPHOCYTES RELATIVE PERCENT: 19.2 % (ref 25–45)
MCH RBC QN AUTO: 24.3 PG (ref 27–31)
MCHC RBC AUTO-ENTMCNC: 29.4 % (ref 32–36)
MCV RBC AUTO: 82.7 FL (ref 78–100)
MONOCYTES ABSOLUTE: 1 K/CU MM
MONOCYTES RELATIVE PERCENT: 10.6 % (ref 0–4)
NUCLEATED RBC %: 0 %
PDW BLD-RTO: 15.1 % (ref 11.7–14.9)
PLATELET # BLD: 264 K/CU MM (ref 140–440)
PMV BLD AUTO: 10.6 FL (ref 7.5–11.1)
RBC # BLD: 3.7 M/CU MM (ref 4.2–5.4)
SEGMENTED NEUTROPHILS ABSOLUTE COUNT: 6.2 K/CU MM
SEGMENTED NEUTROPHILS RELATIVE PERCENT: 66.2 % (ref 34–64)
TOTAL IMMATURE NEUTOROPHIL: 0.21 K/CU MM
TOTAL NUCLEATED RBC: 0 K/CU MM
WBC # BLD: 9.4 K/CU MM (ref 4–10.5)

## 2024-03-02 PROCEDURE — 85025 COMPLETE CBC W/AUTO DIFF WBC: CPT

## 2024-03-02 PROCEDURE — 6370000000 HC RX 637 (ALT 250 FOR IP): Performed by: OBSTETRICS & GYNECOLOGY

## 2024-03-02 PROCEDURE — 94761 N-INVAS EAR/PLS OXIMETRY MLT: CPT

## 2024-03-02 RX ORDER — FERROUS SULFATE 325(65) MG
325 TABLET ORAL 2 TIMES DAILY
Qty: 60 TABLET | Refills: 2 | Status: SHIPPED | OUTPATIENT
Start: 2024-03-02

## 2024-03-02 RX ORDER — ZOLPIDEM TARTRATE 5 MG/1
10 TABLET ORAL ONCE
Status: COMPLETED | OUTPATIENT
Start: 2024-03-02 | End: 2024-03-02

## 2024-03-02 RX ADMIN — ZOLPIDEM TARTRATE 10 MG: 5 TABLET ORAL at 02:28

## 2024-03-02 RX ADMIN — HYDROCODONE BITARTRATE AND ACETAMINOPHEN 1 TABLET: 5; 325 TABLET ORAL at 06:01

## 2024-03-02 ASSESSMENT — PAIN - FUNCTIONAL ASSESSMENT: PAIN_FUNCTIONAL_ASSESSMENT: ACTIVITIES ARE NOT PREVENTED

## 2024-03-02 ASSESSMENT — PAIN SCALES - GENERAL: PAINLEVEL_OUTOF10: 6

## 2024-03-02 NOTE — FLOWSHEET NOTE
Ambulated from unit with mother with all personal belongings. Has written copies of discharge instructions. Both patient and patient's mother verbalize understanding. Patient plans to follow up with OB on March 6.

## 2024-03-02 NOTE — PLAN OF CARE
RN  Outcome: Progressing  Goal: Absence of infection during hospitalization  3/2/2024 1009 by Emerita Gordillo RN  Outcome: Completed  3/1/2024 2216 by Barbara Casey RN  Outcome: Progressing  Goal: Absence of fever/infection during anticipated neutropenic period  3/2/2024 1009 by Emerita Gordillo RN  Outcome: Completed  3/1/2024 2216 by Barbara Casey RN  Outcome: Progressing     Problem: Safety - Adult  Goal: Free from fall injury  3/2/2024 1009 by Emerita Gordillo RN  Outcome: Completed  Flowsheets (Taken 3/2/2024 0930)  Free From Fall Injury: Instruct family/caregiver on patient safety  3/1/2024 2216 by Barbara Casey RN  Outcome: Progressing     Problem: Discharge Planning  Goal: Discharge to home or other facility with appropriate resources  3/2/2024 1009 by Emerita Gordillo RN  Outcome: Completed  Flowsheets (Taken 3/2/2024 0930)  Discharge to home or other facility with appropriate resources:   Identify barriers to discharge with patient and caregiver   Identify discharge learning needs (meds, wound care, etc)  3/1/2024 2216 by Barbara Casey RN  Outcome: Progressing     Problem: Chronic Conditions and Co-morbidities  Goal: Patient's chronic conditions and co-morbidity symptoms are monitored and maintained or improved  3/2/2024 1009 by Emerita Gordillo RN  Outcome: Completed  Flowsheets (Taken 3/2/2024 0930)  Care Plan - Patient's Chronic Conditions and Co-Morbidity Symptoms are Monitored and Maintained or Improved:   Monitor and assess patient's chronic conditions and comorbid symptoms for stability, deterioration, or improvement   Collaborate with multidisciplinary team to address chronic and comorbid conditions and prevent exacerbation or deterioration   Update acute care plan with appropriate goals if chronic or comorbid symptoms are exacerbated and prevent overall improvement and discharge  3/1/2024 2216 by Barbara Casey RN  Outcome: Progressing

## 2024-03-02 NOTE — PROGRESS NOTES
RN placed call to Dr. Mtz. Pt stated pain is 7 and not getting better. Orders received and verified with telephone readback. No questions at this time.

## 2024-03-02 NOTE — DISCHARGE SUMMARY
Obstetric Discharge Summary,Undelivered    Patient Name:    Siomara Larsen    Medical Record Number:   [id number]    Attending:     Jaycee Mtz MD    Date of Admission:    2024    Date of Discharge:      3/2/24    Admitting Diagnosis  IUP 37  weeks, pyelonephritis  OB History          1    Para        Term                AB        Living             SAB        IAB        Ectopic        Molar        Multiple   0    Live Births                    Reasons for Admission on 2024  7:45 PM  Labor and delivery, indication for care [O75.9]  No comment available  Onset of Labor  Observation/Evaluation (Obstetric Complications): pyelonephritis      Discharge Diagnosis   IUP at 38 weeks, pyelonephritis    Discharge Labs:    Discharge Meds:       Medication List        START taking these medications      cephALEXin 500 MG capsule  Commonly known as: KEFLEX  Take 1 capsule by mouth 4 times daily for 7 days     ferrous sulfate 325 (65 Fe) MG tablet  Commonly known as: IRON 325  Take 1 tablet by mouth 2 times daily            CONTINUE taking these medications      PRENATAL 1 PO     UNISOM PO            STOP taking these medications      cetirizine 10 MG tablet  Commonly known as: ZYRTEC     pantoprazole 20 MG tablet  Commonly known as: PROTONIX     vitamin B-6 50 MG tablet  Commonly known as: PYRIDOXINE            ASK your doctor about these medications      ondansetron 4 MG tablet  Commonly known as: ZOFRAN               Where to Get Your Medications        These medications were sent to [facility]/pharmacy #[id number] - [place], [place] - [address] - P [phone] - F [phone]  [address] [place] [address]      Phone: [phone]   ferrous sulfate 325 (65 Fe) MG tablet         Discharge Information  Current Discharge Medication List        START taking these medications    Details   ferrous sulfate (IRON 325) 325 (65 Fe) MG tablet Take 1 tablet by mouth 2 times

## 2024-03-02 NOTE — DISCHARGE INSTRUCTIONS
LABOR    Call your doctor, or come to the hospital, if you have:    Contractions coming about every 3-5 minutes apart, for about one hour. Labor contractions are usually strong enough that you can not walk or talk while having the contractions. ( Contractions can feel like menstrual cramps, tightening in your abdomen, rectal pressure or a backache. This feeling comes and goes.)    Vaginal leaking of fluid.    Heavy, bright red bleeding, like the days of your period. ( A little bloody show or spotting is normal in labor.)    ALWAYS CALL YOUR DOCTOR IF YOU:    Notice decreased movement of your baby, different from what you are used to.    Have heavy, bright red bleeding, like the heavy days of your periods.    Have vaginal leaking of fluid.

## 2024-03-03 LAB
CULTURE: NORMAL
CULTURE: NORMAL
Lab: NORMAL
Lab: NORMAL
SPECIMEN: NORMAL
SPECIMEN: NORMAL

## 2024-03-05 LAB
CULTURE: NORMAL
CULTURE: NORMAL
Lab: NORMAL
Lab: NORMAL
SPECIMEN: NORMAL
SPECIMEN: NORMAL

## 2024-03-11 ENCOUNTER — HOSPITAL ENCOUNTER (INPATIENT)
Age: 19
LOS: 3 days | Discharge: HOME | End: 2024-03-14
Attending: OBSTETRICS & GYNECOLOGY | Admitting: OBSTETRICS & GYNECOLOGY
Payer: COMMERCIAL

## 2024-03-11 LAB
ABO/RH: NORMAL
AMPHETAMINES: NEGATIVE
ANTIBODY SCREEN: NEGATIVE
BARBITURATE SCREEN URINE: NEGATIVE
BENZODIAZEPINE SCREEN, URINE: NEGATIVE
BILIRUBIN URINE: NEGATIVE MG/DL
BLOOD, URINE: NEGATIVE
CANNABINOID SCREEN URINE: NEGATIVE
CLARITY: CLEAR
COCAINE METABOLITE: NEGATIVE
COLOR: YELLOW
COMMENT UA: NORMAL
FENTANYL URINE: NEGATIVE
GLUCOSE, URINE: NEGATIVE MG/DL
HCT VFR BLD CALC: 29.7 % (ref 37–47)
HEMOGLOBIN: 8.8 GM/DL (ref 12.5–16)
KETONES, URINE: NEGATIVE MG/DL
LEUKOCYTE ESTERASE, URINE: NEGATIVE
MCH RBC QN AUTO: 24.2 PG (ref 27–31)
MCHC RBC AUTO-ENTMCNC: 29.6 % (ref 32–36)
MCV RBC AUTO: 81.8 FL (ref 78–100)
NITRITE URINE, QUANTITATIVE: NEGATIVE
OPIATES, URINE: NEGATIVE
OXYCODONE: NEGATIVE
PDW BLD-RTO: 15.7 % (ref 11.7–14.9)
PH, URINE: 7 (ref 5–8)
PLATELET # BLD: 364 K/CU MM (ref 140–440)
PMV BLD AUTO: 10.9 FL (ref 7.5–11.1)
PROTEIN UA: NEGATIVE MG/DL
RBC # BLD: 3.63 M/CU MM (ref 4.2–5.4)
SPECIFIC GRAVITY UA: 1.01 (ref 1–1.03)
UROBILINOGEN, URINE: 0.2 MG/DL (ref 0.2–1)
WBC # BLD: 10.5 K/CU MM (ref 4–10.5)

## 2024-03-11 PROCEDURE — 81003 URINALYSIS AUTO W/O SCOPE: CPT

## 2024-03-11 PROCEDURE — 86900 BLOOD TYPING SEROLOGIC ABO: CPT

## 2024-03-11 PROCEDURE — 86850 RBC ANTIBODY SCREEN: CPT

## 2024-03-11 PROCEDURE — 85027 COMPLETE CBC AUTOMATED: CPT

## 2024-03-11 PROCEDURE — 86780 TREPONEMA PALLIDUM: CPT

## 2024-03-11 PROCEDURE — 1220000000 HC SEMI PRIVATE OB R&B

## 2024-03-11 PROCEDURE — 6370000000 HC RX 637 (ALT 250 FOR IP): Performed by: OBSTETRICS & GYNECOLOGY

## 2024-03-11 PROCEDURE — 80307 DRUG TEST PRSMV CHEM ANLYZR: CPT

## 2024-03-11 PROCEDURE — 86901 BLOOD TYPING SEROLOGIC RH(D): CPT

## 2024-03-11 RX ORDER — FAMOTIDINE 10 MG/ML
20 INJECTION, SOLUTION INTRAVENOUS 2 TIMES DAILY PRN
Status: DISCONTINUED | OUTPATIENT
Start: 2024-03-11 | End: 2024-03-12

## 2024-03-11 RX ORDER — TERBUTALINE SULFATE 1 MG/ML
0.25 INJECTION, SOLUTION SUBCUTANEOUS
Status: DISCONTINUED | OUTPATIENT
Start: 2024-03-11 | End: 2024-03-12

## 2024-03-11 RX ORDER — FENTANYL CITRATE 50 UG/ML
100 INJECTION, SOLUTION INTRAMUSCULAR; INTRAVENOUS
Status: DISCONTINUED | OUTPATIENT
Start: 2024-03-11 | End: 2024-03-12

## 2024-03-11 RX ORDER — SODIUM CHLORIDE, SODIUM LACTATE, POTASSIUM CHLORIDE, AND CALCIUM CHLORIDE .6; .31; .03; .02 G/100ML; G/100ML; G/100ML; G/100ML
1000 INJECTION, SOLUTION INTRAVENOUS PRN
Status: DISCONTINUED | OUTPATIENT
Start: 2024-03-11 | End: 2024-03-12

## 2024-03-11 RX ORDER — SODIUM CHLORIDE, SODIUM LACTATE, POTASSIUM CHLORIDE, AND CALCIUM CHLORIDE .6; .31; .03; .02 G/100ML; G/100ML; G/100ML; G/100ML
500 INJECTION, SOLUTION INTRAVENOUS PRN
Status: DISCONTINUED | OUTPATIENT
Start: 2024-03-11 | End: 2024-03-12

## 2024-03-11 RX ADMIN — Medication 25 MCG: at 22:00

## 2024-03-12 ENCOUNTER — ANESTHESIA EVENT (OUTPATIENT)
Dept: LABOR AND DELIVERY | Age: 19
End: 2024-03-12
Payer: COMMERCIAL

## 2024-03-12 ENCOUNTER — ANESTHESIA (OUTPATIENT)
Dept: LABOR AND DELIVERY | Age: 19
End: 2024-03-12
Payer: COMMERCIAL

## 2024-03-12 PROBLEM — Z34.90 ENCOUNTER FOR ELECTIVE INDUCTION OF LABOR: Status: ACTIVE | Noted: 2024-03-12

## 2024-03-12 LAB — T. PALLIDUM SCREEN: NEGATIVE

## 2024-03-12 PROCEDURE — 3E033VJ INTRODUCTION OF OTHER HORMONE INTO PERIPHERAL VEIN, PERCUTANEOUS APPROACH: ICD-10-PCS | Performed by: OBSTETRICS & GYNECOLOGY

## 2024-03-12 PROCEDURE — 0KQM0ZZ REPAIR PERINEUM MUSCLE, OPEN APPROACH: ICD-10-PCS | Performed by: OBSTETRICS & GYNECOLOGY

## 2024-03-12 PROCEDURE — 10907ZC DRAINAGE OF AMNIOTIC FLUID, THERAPEUTIC FROM PRODUCTS OF CONCEPTION, VIA NATURAL OR ARTIFICIAL OPENING: ICD-10-PCS | Performed by: OBSTETRICS & GYNECOLOGY

## 2024-03-12 PROCEDURE — 6360000002 HC RX W HCPCS: Performed by: OBSTETRICS & GYNECOLOGY

## 2024-03-12 PROCEDURE — 6360000002 HC RX W HCPCS: Performed by: NURSE ANESTHETIST, CERTIFIED REGISTERED

## 2024-03-12 PROCEDURE — 3700000025 EPIDURAL BLOCK: Performed by: NURSE ANESTHETIST, CERTIFIED REGISTERED

## 2024-03-12 PROCEDURE — 6370000000 HC RX 637 (ALT 250 FOR IP): Performed by: OBSTETRICS & GYNECOLOGY

## 2024-03-12 PROCEDURE — 2580000003 HC RX 258: Performed by: OBSTETRICS & GYNECOLOGY

## 2024-03-12 PROCEDURE — 3E0P7VZ INTRODUCTION OF HORMONE INTO FEMALE REPRODUCTIVE, VIA NATURAL OR ARTIFICIAL OPENING: ICD-10-PCS | Performed by: OBSTETRICS & GYNECOLOGY

## 2024-03-12 PROCEDURE — 7200000001 HC VAGINAL DELIVERY

## 2024-03-12 PROCEDURE — 51702 INSERT TEMP BLADDER CATH: CPT

## 2024-03-12 PROCEDURE — 2500000003 HC RX 250 WO HCPCS: Performed by: NURSE ANESTHETIST, CERTIFIED REGISTERED

## 2024-03-12 PROCEDURE — 6360000002 HC RX W HCPCS: Performed by: ADVANCED PRACTICE MIDWIFE

## 2024-03-12 PROCEDURE — 1220000000 HC SEMI PRIVATE OB R&B

## 2024-03-12 RX ORDER — SODIUM CHLORIDE 0.9 % (FLUSH) 0.9 %
5-40 SYRINGE (ML) INJECTION PRN
Status: DISCONTINUED | OUTPATIENT
Start: 2024-03-12 | End: 2024-03-14 | Stop reason: HOSPADM

## 2024-03-12 RX ORDER — HYDROCODONE BITARTRATE AND ACETAMINOPHEN 5; 325 MG/1; MG/1
2 TABLET ORAL EVERY 4 HOURS PRN
Status: DISCONTINUED | OUTPATIENT
Start: 2024-03-12 | End: 2024-03-14 | Stop reason: HOSPADM

## 2024-03-12 RX ORDER — ROPIVACAINE HYDROCHLORIDE 2 MG/ML
INJECTION, SOLUTION EPIDURAL; INFILTRATION; PERINEURAL PRN
Status: DISCONTINUED | OUTPATIENT
Start: 2024-03-12 | End: 2024-03-12 | Stop reason: SDUPTHER

## 2024-03-12 RX ORDER — ROPIVACAINE HYDROCHLORIDE 2 MG/ML
10 INJECTION, SOLUTION EPIDURAL; INFILTRATION; PERINEURAL CONTINUOUS
Status: DISCONTINUED | OUTPATIENT
Start: 2024-03-12 | End: 2024-03-12

## 2024-03-12 RX ORDER — SODIUM CHLORIDE 0.9 % (FLUSH) 0.9 %
5-40 SYRINGE (ML) INJECTION EVERY 12 HOURS SCHEDULED
Status: DISCONTINUED | OUTPATIENT
Start: 2024-03-12 | End: 2024-03-14 | Stop reason: HOSPADM

## 2024-03-12 RX ORDER — ACETAMINOPHEN 500 MG
1000 TABLET ORAL EVERY 8 HOURS SCHEDULED
Status: DISCONTINUED | OUTPATIENT
Start: 2024-03-12 | End: 2024-03-14 | Stop reason: HOSPADM

## 2024-03-12 RX ORDER — SODIUM CHLORIDE, SODIUM LACTATE, POTASSIUM CHLORIDE, CALCIUM CHLORIDE 600; 310; 30; 20 MG/100ML; MG/100ML; MG/100ML; MG/100ML
INJECTION, SOLUTION INTRAVENOUS CONTINUOUS
Status: DISCONTINUED | OUTPATIENT
Start: 2024-03-12 | End: 2024-03-12

## 2024-03-12 RX ORDER — ONDANSETRON 4 MG/1
8 TABLET, ORALLY DISINTEGRATING ORAL EVERY 8 HOURS PRN
Status: DISCONTINUED | OUTPATIENT
Start: 2024-03-12 | End: 2024-03-14 | Stop reason: HOSPADM

## 2024-03-12 RX ORDER — ONDANSETRON 2 MG/ML
4 INJECTION INTRAMUSCULAR; INTRAVENOUS EVERY 6 HOURS PRN
Status: DISCONTINUED | OUTPATIENT
Start: 2024-03-12 | End: 2024-03-12

## 2024-03-12 RX ORDER — MISOPROSTOL 200 UG/1
400 TABLET ORAL PRN
Status: DISCONTINUED | OUTPATIENT
Start: 2024-03-12 | End: 2024-03-12

## 2024-03-12 RX ORDER — DOCUSATE SODIUM 100 MG/1
100 CAPSULE, LIQUID FILLED ORAL 2 TIMES DAILY
Status: DISCONTINUED | OUTPATIENT
Start: 2024-03-12 | End: 2024-03-14 | Stop reason: HOSPADM

## 2024-03-12 RX ORDER — ONDANSETRON 2 MG/ML
4 INJECTION INTRAMUSCULAR; INTRAVENOUS EVERY 6 HOURS PRN
Status: DISCONTINUED | OUTPATIENT
Start: 2024-03-12 | End: 2024-03-14 | Stop reason: HOSPADM

## 2024-03-12 RX ORDER — CARBOPROST TROMETHAMINE 250 UG/ML
250 INJECTION, SOLUTION INTRAMUSCULAR PRN
Status: DISCONTINUED | OUTPATIENT
Start: 2024-03-12 | End: 2024-03-12

## 2024-03-12 RX ORDER — SODIUM CHLORIDE 9 MG/ML
INJECTION, SOLUTION INTRAVENOUS PRN
Status: DISCONTINUED | OUTPATIENT
Start: 2024-03-12 | End: 2024-03-14 | Stop reason: HOSPADM

## 2024-03-12 RX ORDER — LIDOCAINE HYDROCHLORIDE 20 MG/ML
INJECTION, SOLUTION EPIDURAL; INFILTRATION; INTRACAUDAL; PERINEURAL PRN
Status: DISCONTINUED | OUTPATIENT
Start: 2024-03-12 | End: 2024-03-12 | Stop reason: SDUPTHER

## 2024-03-12 RX ORDER — ONDANSETRON 4 MG/1
4 TABLET, ORALLY DISINTEGRATING ORAL EVERY 6 HOURS PRN
Status: DISCONTINUED | OUTPATIENT
Start: 2024-03-12 | End: 2024-03-12

## 2024-03-12 RX ORDER — FENTANYL CITRATE 50 UG/ML
INJECTION, SOLUTION INTRAMUSCULAR; INTRAVENOUS PRN
Status: DISCONTINUED | OUTPATIENT
Start: 2024-03-12 | End: 2024-03-12 | Stop reason: SDUPTHER

## 2024-03-12 RX ORDER — IBUPROFEN 800 MG/1
800 TABLET ORAL EVERY 8 HOURS SCHEDULED
Status: DISCONTINUED | OUTPATIENT
Start: 2024-03-12 | End: 2024-03-14 | Stop reason: HOSPADM

## 2024-03-12 RX ORDER — NALOXONE HYDROCHLORIDE 0.4 MG/ML
INJECTION, SOLUTION INTRAMUSCULAR; INTRAVENOUS; SUBCUTANEOUS PRN
Status: DISCONTINUED | OUTPATIENT
Start: 2024-03-12 | End: 2024-03-12

## 2024-03-12 RX ORDER — HYDROCODONE BITARTRATE AND ACETAMINOPHEN 5; 325 MG/1; MG/1
1 TABLET ORAL EVERY 4 HOURS PRN
Status: DISCONTINUED | OUTPATIENT
Start: 2024-03-12 | End: 2024-03-14 | Stop reason: HOSPADM

## 2024-03-12 RX ORDER — ACETAMINOPHEN 500 MG
1000 TABLET ORAL EVERY 6 HOURS PRN
Status: DISCONTINUED | OUTPATIENT
Start: 2024-03-12 | End: 2024-03-12

## 2024-03-12 RX ORDER — LANOLIN 72 %
OINTMENT (GRAM) TOPICAL PRN
Status: DISCONTINUED | OUTPATIENT
Start: 2024-03-12 | End: 2024-03-14 | Stop reason: HOSPADM

## 2024-03-12 RX ORDER — LIDOCAINE HYDROCHLORIDE 10 MG/ML
INJECTION, SOLUTION EPIDURAL; INFILTRATION; INTRACAUDAL; PERINEURAL PRN
Status: DISCONTINUED | OUTPATIENT
Start: 2024-03-12 | End: 2024-03-12 | Stop reason: SDUPTHER

## 2024-03-12 RX ORDER — METHYLERGONOVINE MALEATE 0.2 MG/ML
200 INJECTION INTRAVENOUS PRN
Status: DISCONTINUED | OUTPATIENT
Start: 2024-03-12 | End: 2024-03-12

## 2024-03-12 RX ORDER — TRANEXAMIC ACID 10 MG/ML
1000 INJECTION, SOLUTION INTRAVENOUS
Status: DISCONTINUED | OUTPATIENT
Start: 2024-03-12 | End: 2024-03-12

## 2024-03-12 RX ADMIN — LIDOCAINE HYDROCHLORIDE 5 ML: 20 INJECTION, SOLUTION EPIDURAL; INFILTRATION; INTRACAUDAL; PERINEURAL at 17:03

## 2024-03-12 RX ADMIN — FENTANYL CITRATE 100 MCG: 50 INJECTION INTRAMUSCULAR; INTRAVENOUS at 03:44

## 2024-03-12 RX ADMIN — ROPIVACAINE HYDROCHLORIDE 5 ML: 2 INJECTION, SOLUTION EPIDURAL; INFILTRATION; PERINEURAL at 12:51

## 2024-03-12 RX ADMIN — Medication 25 MCG: at 06:05

## 2024-03-12 RX ADMIN — ONDANSETRON 4 MG: 2 INJECTION INTRAMUSCULAR; INTRAVENOUS at 08:17

## 2024-03-12 RX ADMIN — SODIUM CHLORIDE, POTASSIUM CHLORIDE, SODIUM LACTATE AND CALCIUM CHLORIDE 500 ML: 600; 310; 30; 20 INJECTION, SOLUTION INTRAVENOUS at 18:57

## 2024-03-12 RX ADMIN — Medication 166.7 ML: at 19:29

## 2024-03-12 RX ADMIN — SODIUM CHLORIDE, POTASSIUM CHLORIDE, SODIUM LACTATE AND CALCIUM CHLORIDE 500 ML: 600; 310; 30; 20 INJECTION, SOLUTION INTRAVENOUS at 11:42

## 2024-03-12 RX ADMIN — SODIUM CHLORIDE, POTASSIUM CHLORIDE, SODIUM LACTATE AND CALCIUM CHLORIDE 500 ML: 600; 310; 30; 20 INJECTION, SOLUTION INTRAVENOUS at 07:53

## 2024-03-12 RX ADMIN — SODIUM CHLORIDE, POTASSIUM CHLORIDE, SODIUM LACTATE AND CALCIUM CHLORIDE: 600; 310; 30; 20 INJECTION, SOLUTION INTRAVENOUS at 16:09

## 2024-03-12 RX ADMIN — FENTANYL CITRATE 100 MCG: 50 INJECTION INTRAMUSCULAR; INTRAVENOUS at 06:02

## 2024-03-12 RX ADMIN — ROPIVACAINE HYDROCHLORIDE 10 ML/HR: 2 INJECTION, SOLUTION EPIDURAL; INFILTRATION; PERINEURAL at 12:59

## 2024-03-12 RX ADMIN — ROPIVACAINE HYDROCHLORIDE 5 ML: 2 INJECTION, SOLUTION EPIDURAL; INFILTRATION; PERINEURAL at 16:48

## 2024-03-12 RX ADMIN — Medication 1 MILLI-UNITS/MIN: at 14:53

## 2024-03-12 RX ADMIN — ACETAMINOPHEN 1000 MG: 500 TABLET ORAL at 00:12

## 2024-03-12 RX ADMIN — ACETAMINOPHEN 1000 MG: 500 TABLET ORAL at 07:49

## 2024-03-12 RX ADMIN — ACETAMINOPHEN 1000 MG: 500 TABLET ORAL at 17:31

## 2024-03-12 RX ADMIN — FENTANYL CITRATE 100 MCG: 50 INJECTION INTRAMUSCULAR; INTRAVENOUS at 07:48

## 2024-03-12 RX ADMIN — METHYLERGONOVINE MALEATE 200 MCG: 0.2 INJECTION, SOLUTION INTRAMUSCULAR; INTRAVENOUS at 19:33

## 2024-03-12 RX ADMIN — ROPIVACAINE HYDROCHLORIDE 5 ML: 2 INJECTION, SOLUTION EPIDURAL; INFILTRATION; PERINEURAL at 12:57

## 2024-03-12 RX ADMIN — SODIUM CHLORIDE, POTASSIUM CHLORIDE, SODIUM LACTATE AND CALCIUM CHLORIDE 500 ML: 600; 310; 30; 20 INJECTION, SOLUTION INTRAVENOUS at 03:54

## 2024-03-12 RX ADMIN — FENTANYL CITRATE 100 MCG: 50 INJECTION, SOLUTION INTRAMUSCULAR; INTRAVENOUS at 17:03

## 2024-03-12 RX ADMIN — FENTANYL CITRATE 100 MCG: 50 INJECTION INTRAMUSCULAR; INTRAVENOUS at 04:45

## 2024-03-12 RX ADMIN — LIDOCAINE HYDROCHLORIDE 3 ML: 10 INJECTION, SOLUTION EPIDURAL; INFILTRATION; INTRACAUDAL; PERINEURAL at 12:45

## 2024-03-12 RX ADMIN — Medication 166.7 ML: at 19:28

## 2024-03-12 RX ADMIN — Medication 25 MCG: at 02:04

## 2024-03-12 RX ADMIN — SODIUM CHLORIDE, POTASSIUM CHLORIDE, SODIUM LACTATE AND CALCIUM CHLORIDE: 600; 310; 30; 20 INJECTION, SOLUTION INTRAVENOUS at 16:10

## 2024-03-12 RX ADMIN — ROPIVACAINE HYDROCHLORIDE 10 ML/HR: 2 INJECTION, SOLUTION EPIDURAL; INFILTRATION; PERINEURAL at 12:45

## 2024-03-12 RX ADMIN — Medication 87.3 MILLI-UNITS/MIN: at 19:37

## 2024-03-12 RX ADMIN — IBUPROFEN 800 MG: 800 TABLET, FILM COATED ORAL at 20:11

## 2024-03-12 ASSESSMENT — PAIN - FUNCTIONAL ASSESSMENT
PAIN_FUNCTIONAL_ASSESSMENT: PREVENTS OR INTERFERES SOME ACTIVE ACTIVITIES AND ADLS
PAIN_FUNCTIONAL_ASSESSMENT: ACTIVITIES ARE NOT PREVENTED
PAIN_FUNCTIONAL_ASSESSMENT: PREVENTS OR INTERFERES WITH MANY ACTIVE NOT PASSIVE ACTIVITIES

## 2024-03-12 ASSESSMENT — PAIN DESCRIPTION - ORIENTATION
ORIENTATION: LOWER

## 2024-03-12 ASSESSMENT — PAIN SCALES - GENERAL
PAINLEVEL_OUTOF10: 2
PAINLEVEL_OUTOF10: 6
PAINLEVEL_OUTOF10: 7

## 2024-03-12 ASSESSMENT — PAIN DESCRIPTION - LOCATION
LOCATION: ABDOMEN
LOCATION: ABDOMEN
LOCATION: BACK
LOCATION: HEAD

## 2024-03-12 ASSESSMENT — PAIN DESCRIPTION - DESCRIPTORS
DESCRIPTORS: SORE
DESCRIPTORS: CRUSHING
DESCRIPTORS: CRUSHING

## 2024-03-12 NOTE — PROGRESS NOTES
Department of Obstetrics and Gynecology  Labor and Delivery   Midwifery Progress Note      SUBJECTIVE:  Siomara is feeling very numb with epidural.     OBJECTIVE:      Fetal heart rate:       Baseline Heart Rate:  130        Accelerations:  present       Variability:  moderate       Decelerations:  early         Contraction frequency: 2-5 minutes    Membranes:  Ruptured meconium stained    Cervix:         Dilation:  4 cm         Effacement:  100%         Station:  -1         Position:  mid             ASSESSMENT     17 yo  at 39.3 weeks  Cat I FHR tracing  Anterior placenta lobe  Anemia  GBS negative     PLAN:    AROM'd for moderate amount of thick meconium-stained fluid.     Will start pitocin and titrate until adequate labor pattern achieved.    I have collaborated and updated Dr Sy and the MD agrees with the current POC.     EFREN Osman - LUI

## 2024-03-12 NOTE — PLAN OF CARE
Problem: Infection - Adult  Goal: Absence of infection at discharge  Recent Flowsheet Documentation  Taken 3/11/2024 2034 by Linda Colon RN  Absence of infection at discharge: Assess and monitor for signs and symptoms of infection  Goal: Absence of infection during hospitalization  Recent Flowsheet Documentation  Taken 3/11/2024 2034 by Linda Colon RN  Absence of infection during hospitalization: Assess and monitor for signs and symptoms of infection

## 2024-03-12 NOTE — FLOWSHEET NOTE
Baseline heart rate 105, moderate variability. Patient in lateral position, IVF bolus given. JAY Prakash CNM reviewed tracing.

## 2024-03-12 NOTE — PLAN OF CARE
Interventions:.  -Monitor labs and assess for signs and symptoms of volume excess or deficit    -Monitor intake, output and patient weight  -Monitor response to interventions for patient's volume status, including labs, urine output, blood pressure (other measures as available)  -Fluid restriction as ordered  -Instruct patient on fluid and nutrition restrictions as appropriate      3/11/2024 2211 by Fouzia Griffin, RN  Outcome: Completed  3/11/2024 2209 by Fouzia Griffin RN  Outcome: Progressing  3/11/2024 2206 by Fouzia Griffin, RN  Flowsheets (Taken 3/11/2024 2206)  Hemodynamic stability and optimal renal function maintained:   Monitor intake, output and patient weight   Monitor response to interventions for patient's volume status, including labs, urine output, blood pressure (other measures as available)   Instruct patient on fluid and nutrition restrictions as appropriate

## 2024-03-12 NOTE — PLAN OF CARE
ordered  -Monitor patient for seizure activity, document and report duration and descrition  -If Seizure occurs, turn patient to dide and suction secretions as needed  -Reorient patient post seizure  -Seizure Pads  -Instruct patient/family to notify RN of any seizure activity  -Instruct patient/family to call for assistance with activity based on assessment  3/11/2024 2211 by Fouzia Griffin RN  Outcome: Completed  3/11/2024 2206 by Fouzia Griffin, RN  Flowsheets (Taken 3/11/2024 2206)  Remains free from seizures and injury related to seizure activity:   Maintain airway, patient safety  and administer oxygen as ordered   Monitor patient for seizure activity, document and report duration and description of seizure to Licensed Independent Practitioner   If seizure occurs, turn patient to side and suction secretions as needed   Seizure pads   Instruct patient/family to notify RN of any seizure activity

## 2024-03-12 NOTE — PLAN OF CARE
maintained  Description: Interventions:.  -Monitor labs and assess for signs and symptoms of volume excess or deficit    -Monitor intake, output and patient weight  -Monitor response to interventions for patient's volume status, including labs, urine output, blood pressure (other measures as available)  -Fluid restriction as ordered  -Instruct patient on fluid and nutrition restrictions as appropriate      3/11/2024 2211 by Fouzia Griffin, RN  Outcome: Completed  3/11/2024 2209 by Fouzia Griffin RN  Outcome: Progressing  3/11/2024 2206 by Fouzia Griffin, RN  Flowsheets (Taken 3/11/2024 2206)  Hemodynamic stability and optimal renal function maintained:   Monitor intake, output and patient weight   Monitor response to interventions for patient's volume status, including labs, urine output, blood pressure (other measures as available)   Instruct patient on fluid and nutrition restrictions as appropriate

## 2024-03-12 NOTE — L&D DELIVERY NOTE
Annalee Larsen Pending Siomara [[id number]]      Labor Events     Labor: No   Steroids: None  Cervical Ripening Date/Time:  3/12/24 06:00:00   Cervical Ripening Type: Misoprostol  Rupture Date/Time:  3/12/24 14:00:00   Rupture Type: AROM, Intact  Fluid Color: Meconium  Meconium Consistency: Moderate  Fluid Odor: None  Fluid Volume: Scant  Induction: Oxytocin, AROM              Labor Event Times      Labor onset date/time:  3/12/24 14:00:00 EDT     Dilation complete date/time:  3/12/24 18:37:00 EDT     Start pushing date/time:  3/12/2024 19:04:00   Decision date/time (emergent ):            Delivery Details      Delivery Date: 3/12/24 Delivery Time: 19:26:59                 Cord                  Placenta    Date/Time: 3/12/2024 19:29:00  Removal: Expressed  Appearance: Intact  Disposition: Placenta Refrigerator       Lacerations    Episiotomy: None  Perineal Lacerations: None  Other Lacerations: no non-perineal laceration       Blood Loss  Mother: Siomara Larsen #[id number]     Start of Mother's Information      Delivery Blood Loss  24 1400 - 24 1941      Quantitative Blood Loss (mL) Hospital Encounter 200 grams    Total  200 mL               End of Mother's Information  Mother: Siomara Larsen #[id number]                Delivery Providers    Delivering clinician:      Provider Role     Obstetrician     Primary Nurse     Primary  Nurse     NICU Nurse     Neonatologist     Anesthesiologist     Nurse Anesthetist     Nurse Practitioner     Midwife     Nursery Nurse               Assessment          Skin Color:   Heart Rate:   Reflex Irritability:   Muscle Tone:   Respiratory Effort:   Total:            1 Minute:         5 Minute:                                                 [place] Measurements                   Department of Obstetrics and Gynecology  Spontaneous Vaginal Delivery Note    Labor & Delivery Summary  Labor Onset Date: 24  Labor Onset Time: 1400  Dilation  Complete Date: 24  Dilation Complete Time:     Pre-operative Diagnosis:  Term pregnancy    Post-operative Diagnosis:  Same + live female wt: not yet weighed  Procedure:  Spontaneous vaginal delivery    Surgeon:  Saida Sy MD    Information for the patient's :  Annalee Larsen Pending Siomara [[id number]]        Anesthesia:  epidural anesthesia    Estimated blood loss:  200 mL    Specimen:  Placenta not sent to pathology     Cord blood sent No    Complications:  none    Condition:  infant stable to general nursery and mother stable    Details of Procedure:   The patient is a 18 y.o. female at 39w3d   OB History          1    Para        Term                AB        Living             SAB        IAB        Ectopic        Molar        Multiple   0    Live Births                 who was admitted for induction. She received the following interventions: ARBOW, vaginal Cytotec, and IV Pitocin augmentation She was known to be GBS negative and did not receive antibiotic prophylaxis. The patient progressed well,did receive an epidural, became complete and started to push. After pushing for 25 mins the fetal head was at the perineum, nose and mouth suctioned with bulb suction and the rest of the infant delivered atraumatically, placed on mother abdomen.Cord was clamped and cut and infant handed off to the waiting nurse for evaluation. The delivery of the placenta was spontaneous. The perineum and vagina were explored and a second degree laceration was repaired in standard fashion.  Infant's name is Justice

## 2024-03-12 NOTE — ANESTHESIA PRE PROCEDURE
ABGs: No results found for: \"PHART\", \"PO2ART\", \"KAV2NFG\", \"FTY2UMS\", \"BEART\", \"A7GPLUSP\"     Type & Screen (If Applicable):  No results found for: \"LABABO\", \"LABRH\"    Drug/Infectious Status (If Applicable):  No results found for: \"HIV\", \"HEPCAB\"    COVID-19 Screening (If Applicable): No results found for: \"COVID19\"        Anesthesia Evaluation  Patient summary reviewed and Nursing notes reviewed   no history of anesthetic complications:   Airway: Mallampati: II  TM distance: >3 FB   Neck ROM: full  Mouth opening: > = 3 FB   Dental: normal exam         Pulmonary:Negative Pulmonary ROS                              Cardiovascular:Negative CV ROS  Exercise tolerance: good (>4 METS)                    Neuro/Psych:   Negative Neuro/Psych ROS              GI/Hepatic/Renal: Neg GI/Hepatic/Renal ROS            Endo/Other: Negative Endo/Other ROS                    Abdominal:             Vascular: negative vascular ROS.         Other Findings:       Anesthesia Plan      epidural     ASA 2             Anesthetic plan and risks discussed with patient and mother.                    Drake Becerra, APRN - CRNA   3/12/2024

## 2024-03-12 NOTE — FLOWSHEET NOTE
Patient and family support updated on plan of care, CNM talked to patient about AROM, epidural. Education given by this RN as well.   Patient asked for some time to consider, given privacy to talk with mother.  No cytotec given at 1000, or recommended at this point due to uterine tachysystole.  Patient wanted more fentanyl IVP, but held, as cummulative dose has been 400 mcg, and baby had change in baseline to 105 from [id number].

## 2024-03-12 NOTE — H&P
Department of Obstetrics and Gynecology   Obstetrics History and Physical        CHIEF COMPLAINT:   Chief Complaint   Patient presents with    Scheduled Induction     Elective IOL         HISTORY OF PRESENT ILLNESS:      The patient is a 18 y.o. female at 39w3d.  OB History          1    Para        Term                AB        Living             SAB        IAB        Ectopic        Molar        Multiple   0    Live Births                Patient presents with a chief complaint as above and is being admitted for induction    Estimated Due Date: Estimated Date of Delivery: 3/16/24    PRENATAL CARE:    Complicated by: anterior placental lobe, anemia    PAST OB HISTORY  OB History          1    Para        Term                AB        Living             SAB        IAB        Ectopic        Molar        Multiple   0    Live Births                    Past Medical History:        Diagnosis Date    Kidney infection in mother during pregnancy          Past Surgical History:    History reviewed. No pertinent surgical history.  Allergies:  Patient has no known allergies.  Social History:    Social History     Socioeconomic History    Marital status: Single     Spouse name: Not on file    Number of children: Not on file    Years of education: Not on file    Highest education level: Not on file   Occupational History    Not on file   Tobacco Use    Smoking status: Never     Passive exposure: Current    Smokeless tobacco: Never   Vaping Use    Vaping Use: Former   Substance and Sexual Activity    Alcohol use: Never    Drug use: Never    Sexual activity: Yes     Partners: Male   Other Topics Concern    Not on file   Social History Narrative    Not on file     Social Determinants of Health     Financial Resource Strain: Not on file   Food Insecurity: No Food Insecurity (3/11/2024)    Hunger Vital Sign     Worried About Running Out of Food in the Last Year: Never true     Ran Out of Food in the Last  an episode of emesis       Pelvis:  Adequate pelvis  Cervix: 1/th per RN exam    Contraction frequency:  1-2 minutes    Membranes:  Intact    ASSESSMENT AND PLAN:    Labor: Admit, anticipate normal delivery, routine labor orders  Fetus: Reassuring  GBS:negative  Other: plan cytotec for cervical ripening and labor induction

## 2024-03-12 NOTE — FLOWSHEET NOTE
JAKUB Becerra in room at 1240, consent obtained, time out completed at 1245. Patient positioned. Catheter was placed at 1248. Test dose given at 1249. Epidural dosed at 1251. Continuous infusion started by CRNA at 1256.   Patient tolerated placement well.

## 2024-03-13 PROCEDURE — 1220000000 HC SEMI PRIVATE OB R&B

## 2024-03-13 PROCEDURE — 6370000000 HC RX 637 (ALT 250 FOR IP): Performed by: OBSTETRICS & GYNECOLOGY

## 2024-03-13 RX ADMIN — IBUPROFEN 800 MG: 800 TABLET, FILM COATED ORAL at 09:18

## 2024-03-13 RX ADMIN — DOCUSATE SODIUM 100 MG: 100 CAPSULE, LIQUID FILLED ORAL at 09:18

## 2024-03-13 RX ADMIN — ACETAMINOPHEN 1000 MG: 500 TABLET ORAL at 22:09

## 2024-03-13 RX ADMIN — IBUPROFEN 800 MG: 800 TABLET, FILM COATED ORAL at 18:20

## 2024-03-13 RX ADMIN — DOCUSATE SODIUM 100 MG: 100 CAPSULE, LIQUID FILLED ORAL at 22:09

## 2024-03-13 RX ADMIN — ACETAMINOPHEN 1000 MG: 500 TABLET ORAL at 14:19

## 2024-03-13 RX ADMIN — ACETAMINOPHEN 1000 MG: 500 TABLET ORAL at 03:44

## 2024-03-13 NOTE — LACTATION NOTE
Assisted mother with breast feeding. Had mother use football position to see different positions. Reminded mother that any position she chooses is okay as long as the infant is latched on to more than just the nipple. Mother verbalizes understanding. Infant gaggy at first, but latches on well and suckles well. Had to be latched on a few times on each side because she kept pulling herself off. Mother holds infant securely and affectionately.

## 2024-03-13 NOTE — PLAN OF CARE
Problem: Vaginal Birth or  Section  Goal: Fetal and maternal status remain reassuring during the birth process  Description:  Birth OB-Pregnancy care plan goal which identifies if the fetal and maternal status remain reassuring during the birth process  3/13/2024 0901 by Eliana Nogueira RN  Outcome: Progressing  3/12/2024 2305 by Charo Medina RN  Outcome: Progressing     Problem: Pain  Goal: Verbalizes/displays adequate comfort level or baseline comfort level  3/13/2024 0901 by Eliana Nogueira RN  Outcome: Progressing  Flowsheets (Taken 3/13/2024 0815)  Verbalizes/displays adequate comfort level or baseline comfort level:   Encourage patient to monitor pain and request assistance   Assess pain using appropriate pain scale   Administer analgesics based on type and severity of pain and evaluate response   Implement non-pharmacological measures as appropriate and evaluate response   Consider cultural and social influences on pain and pain management   Notify Licensed Independent Practitioner if interventions unsuccessful or patient reports new pain  3/12/2024 2305 by Charo Medina RN  Outcome: Progressing  Flowsheets (Taken 3/12/2024 2009 by Linda Cooln, RN)  Verbalizes/displays adequate comfort level or baseline comfort level:   Encourage patient to monitor pain and request assistance   Assess pain using appropriate pain scale   Administer analgesics based on type and severity of pain and evaluate response     Problem: Safety - Adult  Goal: Free from fall injury  3/13/2024 0901 by Eliana Nogueira RN  Outcome: Progressing  3/12/2024 2305 by Charo Medina RN  Outcome: Progressing  Flowsheets (Taken 3/12/2024 1938 by Linda Colon, RN)  Free From Fall Injury: Instruct family/caregiver on patient safety     Problem: Infection - Adult  Goal: Absence of infection at discharge  3/13/2024 0901 by Eliana Nogueira RN  Outcome: Progressing  3/12/2024 2305 by Charo Medina RN  Outcome: Progressing     Problem:  Flowsheet Documentation  Taken 3/12/2024 1938 by Linda Colon RN  Achieves optimal ventilation and oxygenation: Assess for changes in respiratory status     Problem: Risk for Deficient Fluid Volume  Goal: Hemodynamic stability and optimal renal function maintained  Description: Interventions:.  -Monitor labs and assess for signs and symptoms of volume excess or deficit    -Monitor intake, output and patient weight  -Monitor response to interventions for patient's volume status, including labs, urine output, blood pressure (other measures as available)  -Fluid restriction as ordered  -Instruct patient on fluid and nutrition restrictions as appropriate      Recent Flowsheet Documentation  Taken 3/12/2024 1938 by Linda Colon, RN  Hemodynamic stability and optimal renal function maintained:   Monitor intake, output and patient weight   Monitor labs and assess for signs and symptoms of volume excess or deficit     Problem: Skin/Tissue Integrity  Goal: Absence of new skin breakdown  Description: 1.  Monitor for areas of redness and/or skin breakdown  2.  Assess vascular access sites hourly  3.  Every 4-6 hours minimum:  Change oxygen saturation probe site  4.  Every 4-6 hours:  If on nasal continuous positive airway pressure, respiratory therapy assess nares and determine need for appliance change or resting period.  3/13/2024 0901 by Eliana Nogueira, RN  Outcome: Progressing  3/12/2024 2305 by Charo Medina, RN  Outcome: Progressing

## 2024-03-13 NOTE — PLAN OF CARE
Problem: Vaginal Birth or  Section  Goal: Fetal and maternal status remain reassuring during the birth process  Description:  Birth OB-Pregnancy care plan goal which identifies if the fetal and maternal status remain reassuring during the birth process  Outcome: Progressing     Problem: Pain  Goal: Verbalizes/displays adequate comfort level or baseline comfort level  Outcome: Progressing  Flowsheets (Taken 3/12/2024 2009 by Linda Colon, RN)  Verbalizes/displays adequate comfort level or baseline comfort level:   Encourage patient to monitor pain and request assistance   Assess pain using appropriate pain scale   Administer analgesics based on type and severity of pain and evaluate response     Problem: Safety - Adult  Goal: Free from fall injury  Outcome: Progressing  Flowsheets (Taken 3/12/2024 1938 by Linda Colon, RN)  Free From Fall Injury: Instruct family/caregiver on patient safety     Problem: Infection - Adult  Goal: Absence of infection at discharge  Outcome: Progressing     Problem: Skin/Tissue Integrity  Goal: Absence of new skin breakdown  Description: 1.  Monitor for areas of redness and/or skin breakdown  2.  Assess vascular access sites hourly  3.  Every 4-6 hours minimum:  Change oxygen saturation probe site  4.  Every 4-6 hours:  If on nasal continuous positive airway pressure, respiratory therapy assess nares and determine need for appliance change or resting period.  Outcome: Progressing

## 2024-03-13 NOTE — LACTATION NOTE
Awaken mother to breast feed infant. Initiated breast feeding and breast feeding teaching. Mother states she would like help with breast feeding and gives permission for breast to be touched by IBCLC to assist with latch on and positioning of infant. Discuss with mother different position changes: side lying, cradle hold, and football hold--mother wants to use cradle hold. Discuss with mother that breast feeding babies should breast feed every 2-3 hours for 10 to 15 minutes on each side. Also discuss with mother to listen and watch for infant feeding cues and that the baby may want to breast feed more frequently. Discuss with mother that she has colostrum for the first few days until her milk comes in and that this is enough for the baby the first few days. Explained to mother that the stomach of the baby is small so it fills up quickly and then empties quickly and that is why the infant needs to breast feed frequently. Discuss with mother that she needs to hold the infant head securely during feedings and to hold her breast with her hand to help guide the breast in infant mouth, and that the infant needs to have a deep latch on, more than just the nipple. Explained to mother that this helps stimulate the milk ducts which are farther back on the breast to produce and release milk and also helps to decrease soreness. Explained to mother that if the baby latches on to just the nipple it will increase soreness for her. Discuss with mother that when the infant is latched on well, she will suckle and then take rest from suckling, but that she should stay latched on and that she should suckle more than pause. Encouraged mother to call for any assistance with breast feeding or any other needs.

## 2024-03-13 NOTE — PROGRESS NOTES
Subjective:     Postpartum Day 1:   The patient feels well and denies emotional concerns. Pain is well controlled with current medications. The baby is well.   The patient is ambulating well and is tolerating a normal diet.    Objective:        Vitals:    03/13/24 0815   BP: 120/70   Pulse: 78   Resp: 16   Temp: 98.1 °F (36.7 °C)   SpO2: 99%     Lab Results   Component Value Date    WBC 10.5 03/11/2024    HGB 8.8 (L) 03/11/2024    HCT 29.7 (L) 03/11/2024    MCV 81.8 03/11/2024     03/11/2024       General:    Alert, appears stated age, cooperative   Lochia:  appropriate   Uterine    Fundus firm and without tenderness   DVT Evaluation:  No evidence of DVT on exam     Assessment:     Postpartum day 1 Doing well postoperatively.     Plan:     Continue current care.    Electronically signed by Jaycee Mtz MD on 3/13/2024 at 1:01 PM

## 2024-03-13 NOTE — LACTATION NOTE
Mother breast fed infant on one side. Up to the bathroom. Her mother states she is planning on breast feeding again once back in bed. Family very supportive. Encouraged mother to breast feed on both breast with each feeding. Encouraged mother to call for any breast feeding assistance.

## 2024-03-13 NOTE — LACTATION NOTE
Mother states breast feeding is going well. Mother states she did not take a breast feeding class, but her mother did breast feed. Her mother is in room with her and supportive.     Lanolin ointment given to mother. Instructed mother that only small amount is needed if she uses. Informed mother to apply small amount to nipple. Informed mother that she does not need to wipe off lanolin because it is safe for the infant. Informed mother that if nipples get sore she can use lanolin ointment, use her own breast milk and to let nipples air dry. Informed mother that these will help decrease soreness. Mother verbalizes understanding.     Breastfeeding booklet along with feeding log sheets given to mother. Reminded mother that infant should breast feed every 2-3 hours and to offer both breast with each feeding. Informed mother that infant should breast feed 10 minute or longer on each side. Encouraged mother to call for any breast feeding assistance or breast feeding concerns. Mother verbalizes understanding.    Electric breast pump prescription given.

## 2024-03-13 NOTE — ANESTHESIA POSTPROCEDURE EVALUATION
Department of Anesthesiology  Postprocedure Note    Patient: Siomara Larsen  MRN: [id number]  YOB: 2005  Date of evaluation: 3/13/2024    Procedure Summary       Date: 03/12/24 Room / Location:     Anesthesia Start: 1245 Anesthesia Stop: 1926    Procedure: Labor Analgesia Diagnosis:     Scheduled Providers:  Responsible Provider: Drake Becerra APRN - CRNA    Anesthesia Type: epidural ASA Status: 2            Anesthesia Type: No value filed.    Landen Phase I: Landen Score: 9    Landen Phase II: Landen Score: 10    Anesthesia Post Evaluation    Patient location during evaluation: bedside  Patient participation: complete - patient participated  Level of consciousness: awake  Pain score: 0  Airway patency: patent  Nausea & Vomiting: no nausea and no vomiting  Cardiovascular status: hemodynamically stable  Respiratory status: acceptable  Hydration status: euvolemic  Pain management: adequate    No notable events documented.

## 2024-03-14 VITALS
RESPIRATION RATE: 18 BRPM | HEART RATE: 86 BPM | SYSTOLIC BLOOD PRESSURE: 124 MMHG | OXYGEN SATURATION: 97 % | DIASTOLIC BLOOD PRESSURE: 70 MMHG | WEIGHT: 180 LBS | TEMPERATURE: 98.3 F | BODY MASS INDEX: 31.89 KG/M2 | HEIGHT: 63 IN

## 2024-03-14 PROCEDURE — 6370000000 HC RX 637 (ALT 250 FOR IP): Performed by: OBSTETRICS & GYNECOLOGY

## 2024-03-14 RX ORDER — IBUPROFEN 800 MG/1
800 TABLET ORAL EVERY 8 HOURS PRN
Qty: 30 TABLET | Refills: 1 | Status: SHIPPED | OUTPATIENT
Start: 2024-03-14

## 2024-03-14 RX ORDER — FERROUS SULFATE 325(65) MG
325 TABLET ORAL
Qty: 90 TABLET | Refills: 1 | Status: SHIPPED | OUTPATIENT
Start: 2024-03-14

## 2024-03-14 RX ADMIN — IBUPROFEN 800 MG: 800 TABLET, FILM COATED ORAL at 03:02

## 2024-03-14 NOTE — FLOWSHEET NOTE
ID Bands checked. Infants ID band removed and stapled to [place] Identification Footprint Sheet, the mother verified as correct, signed and witnessed by RN. Hugs tag removed. Mother of baby signed Safe Baby Crib Form verifying that she does have a safe crib for baby at home. Baby discharge Instructions given and reviewed. Mother voiced understanding. Grandmother of baby is driving mother and baby home. Mother verbalized understanding to [facility] days. Baby harnessed into carseat at discharge by parents. Mother and baby escorted to hospital exit by nurse.

## 2024-03-14 NOTE — PLAN OF CARE
Problem: Vaginal Birth or  Section  Goal: Fetal and maternal status remain reassuring during the birth process  Description:  Birth OB-Pregnancy care plan goal which identifies if the fetal and maternal status remain reassuring during the birth process  3/14/2024 0805 by Dot Mariscal RN  Outcome: Completed  3/14/2024 0155 by Charo Medina RN  Outcome: Progressing     Problem: Pain  Goal: Verbalizes/displays adequate comfort level or baseline comfort level  3/14/2024 0805 by Dot Mariscal RN  Outcome: Completed  3/14/2024 0155 by Charo Medina RN  Outcome: Progressing  Flowsheets (Taken 3/13/2024 2120)  Verbalizes/displays adequate comfort level or baseline comfort level:   Encourage patient to monitor pain and request assistance   Assess pain using appropriate pain scale   Administer analgesics based on type and severity of pain and evaluate response   Implement non-pharmacological measures as appropriate and evaluate response   Consider cultural and social influences on pain and pain management   Notify Licensed Independent Practitioner if interventions unsuccessful or patient reports new pain     Problem: Safety - Adult  Goal: Free from fall injury  3/14/2024 0805 by Dot Mariscal RN  Outcome: Completed  3/14/2024 0155 by Charo Medina RN  Outcome: Progressing     Problem: Infection - Adult  Goal: Absence of infection at discharge  3/14/2024 0805 by Dot Mariscal RN  Outcome: Completed  3/14/2024 0155 by Charo Medina RN  Outcome: Progressing     Problem: Skin/Tissue Integrity  Goal: Absence of new skin breakdown  Description: 1.  Monitor for areas of redness and/or skin breakdown  2.  Assess vascular access sites hourly  3.  Every 4-6 hours minimum:  Change oxygen saturation probe site  4.  Every 4-6 hours:  If on nasal continuous positive airway pressure, respiratory therapy assess nares and determine need for appliance change or resting period.  3/14/2024 0805 by Dot Mariscal RN  Outcome:  Completed  3/14/2024 0155 by Charo Medina, RN  Outcome: Progressing     Problem: Alteration in the Breast  Goal: Optimize infant feeding at the breast  Description: INTERVENTIONS:  1. Breast and nipple assessment  2. Assess prior breast feeding history  3. Hand expression of breast milk  4. Mechanical pumping  5. Nipple Shield  6. Supplemental formula feeding (LIP order)  7. Supplemental feeding system/device  8. For cracked, bleeding and or sore nipples reassess latch, treat damaged nipple  3/14/2024 0805 by Dot Mariscal RN  Outcome: Completed  3/14/2024 0155 by Charo Medina, RN  Outcome: Progressing     Problem: Inadequate Latch, Suck, or Swallow  Goal: Demonstrate ability to latch and sustain latch, audible swallowing and satiety  Description: INTERVENTIONS:  1.  Assess oral anatomy, notify LIP for abnormal findings  2.  Hand expression  3.  Maximize feeding opportunity (skin to skin, behavioral state)  4.  Positioning techniques  5.  Discourage use of pacifier-artificial nipple  6.  Educate mother on feeding cues  3/14/2024 0805 by Dot Mariscal, RN  Outcome: Completed  3/14/2024 0155 by Charo Medina, RN  Outcome: Progressing

## 2024-03-14 NOTE — DISCHARGE SUMMARY
Obstetrical Discharge Form    Main OB/GYN Provider   [x]  [facility]   []  OB/GYN [facility]  []  [facility]      Gestational Age:  39w3d    Antepartum complications: chronic anemia    Date of Delivery:   3/12/24      Type of Delivery:   vaginal, spontaneous    Delivered By:     []  Zak Chao MD   [x]  Saida Sy MD   []  Jaycee Mtz MD   []  Jerson Torres MD   []  Jeanie Wallace CNM   []  Ana Maria Ricci CNM   []  Cecilia Prakash CNM    []  Yasir Bae MD    Baby:       Information for the patient's :  Jose Larsen [[id number]]        Anesthesia:    Epidural    Intrapartum complications: None    Feeding method:   Breast    Postpartum complications: None    Discharge Date:    3//14/24    Discharge Condition:           Good    Plan:   Follow up    6 Weeks Postpartum      Electronically signed by Jaycee Mtz MD on 3/14/2024 at 8:16 AM

## 2024-03-14 NOTE — LACTATION NOTE
Mother holding infant affectionately. States breastfeeding is going well. Mother waiting to go home. Encouraged mother that once home if she has any questions or concerns to call. Mother verbalizes understanding.

## 2024-03-14 NOTE — PROGRESS NOTES
Outpatient Pharmacy Progress Note for Meds-to-Beds    Total number of Prescriptions Filled: 2  The following medications were dispensed to the patient during the discharge process:  ferrous sulfate  ibuprofen    Additional Documentation:  Medication(s) were delivered to the patient's room prior to discharge by a volunteer       Thank you for letting us serve your patients.  [address] [address]    Phone: [phone]    Fax: [phone]

## 2024-03-14 NOTE — PLAN OF CARE
latch, treat damaged nipple  Outcome: Progressing     Problem: Inadequate Latch, Suck, or Swallow  Goal: Demonstrate ability to latch and sustain latch, audible swallowing and satiety  Description: INTERVENTIONS:  1.  Assess oral anatomy, notify LIP for abnormal findings  2.  Hand expression  3.  Maximize feeding opportunity (skin to skin, behavioral state)  4.  Positioning techniques  5.  Discourage use of pacifier-artificial nipple  6.  Educate mother on feeding cues  Outcome: Progressing

## 2024-03-14 NOTE — LACTATION NOTE
Resting in bed. States breast feeding is going well. States breast are filling. Reminded mother to breast feed at least every 3 hours and to offer both breast with each feeding. Encouraged mother to call for any breast feeding assistance.

## 2024-12-26 ENCOUNTER — HOSPITAL ENCOUNTER (EMERGENCY)
Age: 19
Discharge: HOME | End: 2024-12-26
Attending: EMERGENCY MEDICINE
Payer: COMMERCIAL

## 2024-12-26 VITALS
RESPIRATION RATE: 16 BRPM | OXYGEN SATURATION: 98 % | TEMPERATURE: 99.5 F | HEIGHT: 63 IN | DIASTOLIC BLOOD PRESSURE: 78 MMHG | HEART RATE: 92 BPM | SYSTOLIC BLOOD PRESSURE: 146 MMHG | WEIGHT: 180 LBS | BODY MASS INDEX: 31.89 KG/M2

## 2024-12-26 DIAGNOSIS — R05.1 ACUTE COUGH: ICD-10-CM

## 2024-12-26 DIAGNOSIS — R11.0 NAUSEA: ICD-10-CM

## 2024-12-26 DIAGNOSIS — J02.0 STREP PHARYNGITIS: Primary | ICD-10-CM

## 2024-12-26 LAB
INFLUENZA A BY PCR: NOT DETECTED
INFLUENZA B BY PCR: NOT DETECTED
S PYO AG THROAT QL: POSITIVE
SARS-COV-2 RDRP RESP QL NAA+PROBE: NOT DETECTED
SPECIMEN DESCRIPTION: NORMAL
SPECIMEN SOURCE: ABNORMAL

## 2024-12-26 PROCEDURE — 87502 INFLUENZA DNA AMP PROBE: CPT

## 2024-12-26 PROCEDURE — 99283 EMERGENCY DEPT VISIT LOW MDM: CPT

## 2024-12-26 PROCEDURE — 87880 STREP A ASSAY W/OPTIC: CPT

## 2024-12-26 PROCEDURE — 87635 SARS-COV-2 COVID-19 AMP PRB: CPT

## 2024-12-26 RX ORDER — ONDANSETRON 4 MG/1
4 TABLET, ORALLY DISINTEGRATING ORAL 3 TIMES DAILY PRN
Qty: 21 TABLET | Refills: 0 | Status: SHIPPED | OUTPATIENT
Start: 2024-12-26

## 2024-12-26 RX ORDER — BROMPHENIRAMINE MALEATE, PSEUDOEPHEDRINE HYDROCHLORIDE, AND DEXTROMETHORPHAN HYDROBROMIDE 2; 30; 10 MG/5ML; MG/5ML; MG/5ML
5 SYRUP ORAL 4 TIMES DAILY PRN
Qty: 118 ML | Refills: 0 | Status: SHIPPED | OUTPATIENT
Start: 2024-12-26

## 2024-12-26 RX ORDER — AMOXICILLIN 500 MG/1
500 CAPSULE ORAL 2 TIMES DAILY
Qty: 20 CAPSULE | Refills: 0 | Status: SHIPPED | OUTPATIENT
Start: 2024-12-26 | End: 2025-01-05

## 2024-12-26 ASSESSMENT — PAIN SCALES - GENERAL: PAINLEVEL_OUTOF10: 5

## 2024-12-26 ASSESSMENT — PAIN - FUNCTIONAL ASSESSMENT: PAIN_FUNCTIONAL_ASSESSMENT: 0-10

## 2024-12-26 ASSESSMENT — PAIN DESCRIPTION - LOCATION: LOCATION: THROAT

## 2024-12-26 NOTE — DISCHARGE INSTRUCTIONS
Establish and follow-up with her primary care physician Dr. Jael Nunez.  Call for an appointment  Take amoxicillin antibiotic as described for strep throat  Take Bromfed cough medicine as prescribed for cough and cold symptoms  Take Zofran as needed for nausea.  Return to the emergency department immediately pain fever chills nausea vomiting dizzy lightheadedness or any worsening symptoms.

## 2024-12-26 NOTE — ED PROVIDER NOTES
Emergency Department Encounter    Patient: Siomara Larsen  MRN: [id number]  : 2005  Date of Evaluation: 2024  ED Provider:  Ingrid Barnett DO    Triage Chief Complaint:   Cough, Pharyngitis, and Nasal Congestion    Ruby:  Siomara Larsen is a 19 y.o. female with no chronic medical illnesses that presents to the emergency department complaining of cough and sore throat nasal congestion for the past week.  States symptoms getting worse instead of better.  Patient states she has been take some over-the-counter cough and cold flu medication.  Patient states nausea no vomiting diarrhea.  Denies any runny nose earache fevers or chills.  Patient here for evaluation.    ROS - see HPI, below listed is current ROS at time of my eval:  10 systems reviewed and negative except as above.     Past Medical History:   Diagnosis Date    Kidney infection in mother during pregnancy          History reviewed. No pertinent surgical history.  History reviewed. No pertinent family history.  Social History     Socioeconomic History    Marital status: Single     Spouse name: Not on file    Number of children: Not on file    Years of education: Not on file    Highest education level: Not on file   Occupational History    Not on file   Tobacco Use    Smoking status: Never     Passive exposure: Current    Smokeless tobacco: Never   Vaping Use    Vaping status: Former   Substance and Sexual Activity    Alcohol use: Never    Drug use: Never    Sexual activity: Yes     Partners: Male   Other Topics Concern    Not on file   Social History Narrative    Not on file     Social Determinants of Health     Financial Resource Strain: Low Risk  (2023)    Received from [facility]    Overall Financial Resource Strain (CARDIA)     Difficulty of Paying Living Expenses: Not hard at all   Food Insecurity: No Food Insecurity (3/11/2024)    Hunger Vital Sign     Worried About Running Out

## 2025-08-25 RX ORDER — CEPHALEXIN 500 MG/1
500 CAPSULE ORAL 4 TIMES DAILY
COMMUNITY

## 2025-08-25 RX ORDER — FAMOTIDINE 20 MG/1
20 TABLET, FILM COATED ORAL 2 TIMES DAILY
COMMUNITY